# Patient Record
Sex: FEMALE | Race: OTHER | NOT HISPANIC OR LATINO | Employment: FULL TIME | URBAN - METROPOLITAN AREA
[De-identification: names, ages, dates, MRNs, and addresses within clinical notes are randomized per-mention and may not be internally consistent; named-entity substitution may affect disease eponyms.]

---

## 2018-11-12 ENCOUNTER — CLINICAL SUPPORT (OUTPATIENT)
Dept: URGENT CARE | Facility: CLINIC | Age: 33
End: 2018-11-12

## 2018-11-12 PROCEDURE — 99499 UNLISTED E&M SERVICE: CPT | Mod: S$GLB,,, | Performed by: EMERGENCY MEDICINE

## 2018-11-12 PROCEDURE — 86580 TB INTRADERMAL TEST: CPT | Mod: ,,, | Performed by: EMERGENCY MEDICINE

## 2020-06-04 ENCOUNTER — OFFICE VISIT (OUTPATIENT)
Dept: FAMILY MEDICINE | Facility: CLINIC | Age: 35
End: 2020-06-04
Payer: COMMERCIAL

## 2020-06-04 VITALS
OXYGEN SATURATION: 99 % | DIASTOLIC BLOOD PRESSURE: 88 MMHG | HEART RATE: 93 BPM | HEIGHT: 64 IN | SYSTOLIC BLOOD PRESSURE: 132 MMHG | WEIGHT: 149.81 LBS | BODY MASS INDEX: 25.57 KG/M2 | TEMPERATURE: 98 F

## 2020-06-04 DIAGNOSIS — Z13.6 SCREENING FOR ISCHEMIC HEART DISEASE: ICD-10-CM

## 2020-06-04 DIAGNOSIS — Z13.89 SCREENING FOR BLOOD OR PROTEIN IN URINE: ICD-10-CM

## 2020-06-04 DIAGNOSIS — Z78.9 USES BIRTH CONTROL: ICD-10-CM

## 2020-06-04 DIAGNOSIS — Z13.0 SCREENING FOR IRON DEFICIENCY ANEMIA: ICD-10-CM

## 2020-06-04 DIAGNOSIS — J30.1 SEASONAL ALLERGIC RHINITIS DUE TO POLLEN: ICD-10-CM

## 2020-06-04 DIAGNOSIS — M54.50 CHRONIC LOW BACK PAIN WITHOUT SCIATICA, UNSPECIFIED BACK PAIN LATERALITY: ICD-10-CM

## 2020-06-04 DIAGNOSIS — K21.9 GASTROESOPHAGEAL REFLUX DISEASE WITHOUT ESOPHAGITIS: ICD-10-CM

## 2020-06-04 DIAGNOSIS — L30.9 DERMATITIS: Primary | ICD-10-CM

## 2020-06-04 DIAGNOSIS — G89.29 CHRONIC LOW BACK PAIN WITHOUT SCIATICA, UNSPECIFIED BACK PAIN LATERALITY: ICD-10-CM

## 2020-06-04 DIAGNOSIS — Z13.29 ENCOUNTER FOR SCREENING FOR ENDOCRINE DISORDER: ICD-10-CM

## 2020-06-04 DIAGNOSIS — G43.709 CHRONIC MIGRAINE WITHOUT AURA WITHOUT STATUS MIGRAINOSUS, NOT INTRACTABLE: ICD-10-CM

## 2020-06-04 PROCEDURE — 3008F BODY MASS INDEX DOCD: CPT | Mod: S$GLB,,, | Performed by: FAMILY MEDICINE

## 2020-06-04 PROCEDURE — 3008F PR BODY MASS INDEX (BMI) DOCUMENTED: ICD-10-PCS | Mod: S$GLB,,, | Performed by: FAMILY MEDICINE

## 2020-06-04 PROCEDURE — 99203 PR OFFICE/OUTPT VISIT, NEW, LEVL III, 30-44 MIN: ICD-10-PCS | Mod: S$GLB,,, | Performed by: FAMILY MEDICINE

## 2020-06-04 PROCEDURE — 99203 OFFICE O/P NEW LOW 30 MIN: CPT | Mod: S$GLB,,, | Performed by: FAMILY MEDICINE

## 2020-06-04 RX ORDER — OMEPRAZOLE 20 MG/1
20 CAPSULE, DELAYED RELEASE ORAL DAILY
COMMUNITY

## 2020-06-04 RX ORDER — BIOTIN 1000 MCG
TABLET,CHEWABLE ORAL
COMMUNITY

## 2020-06-04 RX ORDER — DROSPIRENONE, ETHINYL ESTRADIOL AND LEVOMEFOLATE CALCIUM AND LEVOMEFOLATE CALCIUM 3-0.02(24)
KIT ORAL
COMMUNITY
Start: 2020-05-31

## 2020-06-04 NOTE — PROGRESS NOTES
SUBJECTIVE:    Patient ID: Meri Krishna is a 35 y.o. female.    Chief Complaint: Establish Care      Pt here to establish care.    Pt with PMHx of HTN, GERD, and Migraine.  Blood sugar issues in her 20s and cholesterol have been elevated in the past.  Have had migraines since 10 years old, familial.    Not currently on anything for her blood pressure.  Got really low when she was pregnant.  Has gained about 10 pounds in the last 2 months.  Normally exercises, but gym has been closed since the COVID pandemic.  Has had more back and bilateral hip pain since having her son. Had an epidural.  The back pain keeps her up at night because she cannot get comfortable and her legs feel heavy like she is carrying around cinder blocks, etc.    Has migraines intermittently.  Can have several a week or can not have them for a while. Has been on preventative meds, including antiepileptics, BP meds.  Unsure of triggers, but knows she can't drink alcohol, issues with homemade breads, [place] foods.    Has a rash that only comes around her period.  Occurs on there lateral right thigh.  It starts as itchy, then get red, bumpy, and then bruises, and then flakes.  Changed the birth control and the rash went away, after skipping the placebo pill.     Has to take prilosec daily or she will wake up in the night looking for tums.    Also she has joint pains and swelling in the hands and ankles get kind of stiff. Does in home IV infusions.  Hurts sometimes to draw up meds.     Has seasonal allergies, takes zyrtec as needed.    Autoimmune disease on the paternal side of the family, with rheumatic and sarcoid  -------------------------------------  Sees GYN yearly,  Pap done yearly, hx abnormal pap 8 years ago with +HPV.       No visits with results within 6 Month(s) from this visit.   Latest known visit with results is:   No results found for any previous visit.       Past Medical History:   Diagnosis Date    Hypertension     Migraine       Past Surgical History:   Procedure Laterality Date    CYST REMOVAL Right 2005     Family History   Problem Relation Age of Onset    Hypertension Mother     Obesity Mother     Diabetes Father     Hypertension Father     Hyperlipidemia Father     Stroke Father     Lung cancer Maternal Grandfather        Marital Status:   Alcohol History:  reports that she drinks alcohol.  Tobacco History:  reports that she has never smoked. She has never used smokeless tobacco.  Drug History:  reports that she does not use drugs.    Review of patient's allergies indicates:   Allergen Reactions    Penicillins Hives, Shortness Of Breath and Swelling       Current Outpatient Medications:     biotin 1,000 mcg Chew, Take by mouth., Disp: , Rfl:     drospirenone-e.estradioL-lm.FA (BEYAZ/LYDIA) 3-0.02-0.451 mg (24) (4) Tab, , Disp: , Rfl:     omeprazole (PRILOSEC) 20 MG capsule, Take 20 mg by mouth once daily., Disp: , Rfl:     Review of Systems   Constitutional: Negative for appetite change, fatigue, fever and unexpected weight change.   Respiratory: Negative for cough, chest tightness, shortness of breath and wheezing.    Cardiovascular: Negative for chest pain and leg swelling.   Gastrointestinal: Negative for abdominal pain, constipation, nausea and vomiting.        -heartburn   Genitourinary: Negative for difficulty urinating, dysuria, frequency and urgency.   Musculoskeletal: Negative for arthralgias, back pain, myalgias and neck pain.   Skin: Negative for rash.   Neurological: Negative for dizziness, weakness, numbness and headaches.   Hematological: Does not bruise/bleed easily.   Psychiatric/Behavioral: Negative for dysphoric mood, sleep disturbance and suicidal ideas. The patient is not nervous/anxious.    All other systems reviewed and are negative.         Objective:      Vitals:    06/04/20 1540   BP: 132/88   Pulse: 93   Temp: 98.3 °F (36.8 °C)   SpO2: 99%   Weight: 67.9 kg (149 lb 12.8 oz)   Height: 5'  "4" (1.626 m)     Body mass index is 25.71 kg/m².     Physical Exam   Constitutional: She is oriented to person, place, and time. She appears well-developed and well-nourished. No distress.   thin   HENT:   Head: Normocephalic and atraumatic.   Neck: Neck supple. No thyromegaly present.   Cardiovascular: Normal rate, regular rhythm and normal heart sounds. Exam reveals no friction rub.   No murmur heard.  Pulmonary/Chest: Effort normal and breath sounds normal. She has no wheezes. She has no rales.   Abdominal: Soft. Bowel sounds are normal. She exhibits no distension. There is no tenderness.   Musculoskeletal: She exhibits no edema.   Lymphadenopathy:     She has no cervical adenopathy.   Neurological: She is alert and oriented to person, place, and time.   Skin: Skin is warm and dry. No rash noted.   Psychiatric: She has a normal mood and affect. Her speech is normal and behavior is normal. Judgment and thought content normal. She is attentive.   Vitals reviewed.        Assessment:       1. Dermatitis    2. Gastroesophageal reflux disease without esophagitis    3. Seasonal allergic rhinitis due to pollen    4. Chronic migraine without aura without status migrainosus, not intractable    5. Chronic low back pain without sciatica, unspecified back pain laterality    6. Screening for ischemic heart disease    7. Uses birth control    8. Encounter for screening for endocrine disorder    9. Screening for blood or protein in urine    10. Screening for iron deficiency anemia         Plan:       Dermatitis  Comments:  Intermittent with menses. Will check labs.  Orders:  -     TSH w/reflex to FT4; Future; Expected date: 06/04/2020  -     Hemoglobin A1C; Future; Expected date: 06/04/2020    Gastroesophageal reflux disease without esophagitis  Comments:  Controlled. Will continue to monitor symptoms on prilosec.     Seasonal allergic rhinitis due to pollen  Comments:  Controlled. To take zyrtec as needed.     Chronic migraine " without aura without status migrainosus, not intractable  Comments:  Stable. Will continue to monitor frequency and severity of headaches.     Chronic low back pain without sciatica, unspecified back pain laterality  Comments:  Chronic. >5 years. Will send to PT, questionable leg length discrepancy. Will reassess after PT.  Orders:  -     Ambulatory referral/consult to Physical/Occupational Therapy; Future; Expected date: 06/11/2020    Screening for ischemic heart disease  -     Comprehensive metabolic panel; Future; Expected date: 06/04/2020  -     Lipid Panel; Future; Expected date: 06/04/2020    Uses birth control    Encounter for screening for endocrine disorder  -     TSH w/reflex to FT4; Future; Expected date: 06/04/2020    Screening for blood or protein in urine  -     Urinalysis Complete; Future; Expected date: 06/04/2020  -     Hemoglobin A1C; Future; Expected date: 06/04/2020    Screening for iron deficiency anemia  -     CBC auto differential; Future; Expected date: 06/04/2020    Labs have been ordered for monitoring of chronic conditions, just before next visit.                                Follow up in about 6 months (around 12/4/2020) for GERD, Migraines, back pain.

## 2020-06-11 ENCOUNTER — PATIENT MESSAGE (OUTPATIENT)
Dept: FAMILY MEDICINE | Facility: CLINIC | Age: 35
End: 2020-06-11

## 2020-06-12 ENCOUNTER — CLINICAL SUPPORT (OUTPATIENT)
Dept: REHABILITATION | Facility: HOSPITAL | Age: 35
End: 2020-06-12
Attending: FAMILY MEDICINE
Payer: COMMERCIAL

## 2020-06-12 DIAGNOSIS — M54.50 CHRONIC LOW BACK PAIN WITHOUT SCIATICA, UNSPECIFIED BACK PAIN LATERALITY: ICD-10-CM

## 2020-06-12 DIAGNOSIS — M53.3 SI (SACROILIAC) JOINT DYSFUNCTION: ICD-10-CM

## 2020-06-12 DIAGNOSIS — R29.898 IMPAIRED FLEXIBILITY OF LOWER EXTREMITY: ICD-10-CM

## 2020-06-12 DIAGNOSIS — M25.651 DECREASED RANGE OF RIGHT HIP MOVEMENT: Primary | ICD-10-CM

## 2020-06-12 DIAGNOSIS — M25.859 HIP IMPINGEMENT SYNDROME, UNSPECIFIED LATERALITY: ICD-10-CM

## 2020-06-12 DIAGNOSIS — R29.898 DECREASED STRENGTH OF LOWER EXTREMITY: ICD-10-CM

## 2020-06-12 DIAGNOSIS — G89.29 CHRONIC LOW BACK PAIN WITHOUT SCIATICA, UNSPECIFIED BACK PAIN LATERALITY: ICD-10-CM

## 2020-06-12 PROBLEM — M25.659 DECREASED RANGE OF MOTION OF HIP: Status: ACTIVE | Noted: 2020-06-12

## 2020-06-12 PROCEDURE — 97161 PT EVAL LOW COMPLEX 20 MIN: CPT

## 2020-06-12 NOTE — PLAN OF CARE
[facility]  Physical Therapy Initial Evaluation    Name: Meri Krishna  [facility] Number: [id number]    Therapy Diagnosis:   Encounter Diagnoses   Name Primary?    Chronic low back pain without sciatica, unspecified back pain laterality     Decreased strength of lower extremity     Decreased range of right hip movement Yes    Impaired flexibility of lower extremity     SI (sacroiliac) joint dysfunction     Hip impingement syndrome, unspecified laterality      Physician: Ashu Hoyt MD    Physician Orders: PT Eval and Treat  Medical Diagnosis from Referral: M54.5,G89.29 (ICD-10-CM) - Chronic low back pain without sciatica, unspecified back pain laterality  Evaluation Date: 6/12/2020  Authorization Period Expiration: 12/31/2020  Current Certification Period: 6/12/20202- 8/21/2020  Plan of Care Expiration: 8/21/2020  Visit # / Visits authorized: 1/ 104 (0/14 per POC)    Time In: 705 AM  Time Out: 750 AM  Total Appointment Time (timed & untimed codes): 45 minutes    Precautions: Standard    Subjective   Date of onset: 5 years ago following vaginal birth of child   History of current condition - Meri reports: bilateral back pain located at PSIS. Pt reports her hips hurt to perform hip flexion and sometimes she has to use her hands to assist with crossing her legs. Pt reports night pain every night. Pt reports she usually has to get up and move around to ease pain sometimes. Pt is a constant ache/discomfort than a sharp pain.  All of this started when she had her son about 5 years ago. Pt denies having this pain prior to having her son. Pt denies numbness and tingling in the legs. Pt denies changes in bowel and bladder. Pt reports the pain is deep and she cant really put her finger on it. Pt reports her legs fatigue more often when she goes up the stairs like her legs feel weak and heavy which is new and feels like she has never been up the stairs but she  has for many years.      Medical History:   Past Medical History:   Diagnosis Date    Hypertension     Migraine        Surgical History:   Meri Krishna  has a past surgical history that includes Cyst Removal (Right, 2005).    Medications:   Meri has a current medication list which includes the following prescription(s): biotin, drospirenone-e.estradiol-lm.fa, and omeprazole.    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, sitting most of the day  Prior Level of Function: independent   Current Level of Function: bike riding, going up the stairs in two story home, unable to run in which she used to run a little, limited range of motion on the L, unable to sit Prydeinig style    Pain:  Current 2/10, worst 8/10, best 2/10   Location: bilateral back   Description: Aching, Dull and Deep   Aggravating Factors: exercising, walking up stairs is hard, but level surface are fine, enjoys the spin class but has not stopped doing this   Easing Factors: heat- doesnt necessary heat, ibuprofen 800 mg / tylenol takes the edge - as needed    Pts goals: decrease pain, more tolerance for activities     Objective     Structural/Postural Inspection:     Standing: normal standing posture     Leg Length:    86 cm bilaterally     Palpation for Condition:     Slight elevation of PSIS and L iliac crest felt, however, supine to sit and leg length was normal   Tenderness to bilateral PSIS   Tenderness to bilateral piriformis   Tenderness/radiating pain with PA glide to lumbar spine, radiating to SI joint     SLS stance: negative for pain     Active Range of Motion (AROM)/Resisted Movements:     Lumbar/Thoracic AROM (Degrees) Comments   Flexion 60  Stretch pull bilaterally    Extension 20  Worse than  flexion    Right Side Bend 15 Stretch    Left Side Bend 15  Stretch    Right Rotation WFL Pain on the R   Left Rotation WFL Pain on the L      Repeated Movements: NT since neg for numbness/tingling     Lumbar Special Test:     Lumbar Special Test  Results Comments   Lumbar Compression  Negative. Just a stretch    Lumbar Distraction  Negative. No change    Prone Instability Negative.      SI Tests/Screen:    Sacroiliac Results Comments   Forten's Sign  Positive. Bilaterally    TTP of Posterior Iliac ligaments  Positive. Bilaterally    SLS pain Negative.    SI Distraction * Positive.    SI Compression * Negative.    Sacral Thrust * Positive.    Gaenslen's* Positive. L only when forced into anterior rotation    Thigh Thrust* Negative.    Standing Forward Flexion Test Positive. Slight decreased movement on the L compared to the R    Gillet's Test Positive. Decreased movement noted on the L compared to the R, pain bilaterally    Supine to sit test  Negative. Indicating normal rotation    FABERs Positive. grion and low back pain    Active SLR with TA activation  Positive. R only    Active SLR with manual compression by PT  Positive. R only      Supine Bridge:  Pain with bridge     LE MMT Testing:  *denotes pain     RLE Strength Grade LLE Strength Grade   Hip Flexion 4+/5 * Hip Flexion 4+/5*   Hip Extension 4+/5 Hip Extension 4+/5   Hip Abduction 4/5 Hip Abduction 4/5   Hip Adduction 4/5 Hip Adduction 4/5   Hip Internal Rotation 4/5* Hip Internal Rotation 4/5*   Hip External Rotation 4-/5* Hip External Rotation 4-/5*   Knee Flexion 4+/5 Knee Flexion 4+/5   Knee Extension 5/5 Knee Extension 5/5   Ankle Dorsiflexion 4+/5 Ankle Dorsiflexion 4+/5     Muscle Length Tension Testing:     Lumbar/Hip/Knee Right  Left  Comments   Hilario Test - -    Hamstring Length (90/90) NT NT Stretch felt with SLR    Ely's Test - -    Debbie's Test + +    Piriformis Length Test        Hip Screen:      Hip/Knee/Ankle (Degrees) AROM (Right) PROM  (Right) AROM (Left) PROM (Left) Comments   Hip Flexion        Hip Extension        Hip Abduction        Hip Adduction        Hip Internal Rotation 45 pain  45 worse than R   pain   Hip External Rotation 25 worse than L  30 pain   pain   Knee Flexion        Knee Extension        Ankle Dorsiflexion        Ankle Plantarflexion        Ankle Inversion        Ankle Eversion          Hip Right Left   Trendelenburg Test Negative. Negative.   FADDIR Test Positive. Positive.   Scour Test Positive. Negative.   [place] Distraction NT. NT.     Sensation: NT 2/2 negative for numbness and tingling reports     Neuro Testing Right  Left Comments   Seated Slump Negative. Positive. Pull at low back    SLR (Sciatic) Negative. Negative.      Joint Accessory: normal mobility with PA glides to lumbar and thoracic spine, however, pain and radiating pain to the SI joint reported with joint assessment     Outcome Measures:     Mod Oswestry Disability Index: 17/50 = 34% disability   LEFS: 37/80 = 46.25% function     TREATMENT     Home Exercises and Patient Education Provided    Education provided:   - Written HEP, Pt demonstrated good understanding based on returned demonstration  - PT goals and POC  -PT educated patient to not perform exercises that are painful as patient reported some hip discomfort with supine piriformis stretch however seated stretch was worse    Written Home Exercises Provided: yes.  Exercises were reviewed and Meri was able to demonstrate them prior to the end of the session.  Meri demonstrated good  understanding of the education provided.     See EMR under Patient Instructions for exercises provided 6/12/2020.    Assessment   Meri is a 35 y.o. female referred to outpatient Physical Therapy with a medical diagnosis of M54.5,G89.29 (ICD-10-CM) - Chronic low back pain without sciatica, unspecified back pain laterality. Pt presents with c/o chronic low back pain that started 5 years ago following the birth  of her child as well as bilateral hip pain. Pt demonstrates Forten's sign and C sign bilaterally. Pt demonstrates signs and symptoms of SI joint dysfunction based on subjective reports and special testing in which she will benefit from core strength to improve stability of SI joint in which distraction test and compression with active SLR lead to a decrease in pain. Pt also demonstrates signs and symptoms consistent with possible hip impingement bilaterally and/or snapping hip syndrome. Pt reports worsening of pain with stairs and exercising especially in the hip. Pt with occasional snapping/popping sensation with hip movement. Pt also demonstrates decreased BLE strength, decreased length of piriformis and IT band, decreased hip ER robbie of R compared to L, pain with hip IR and ER, and WFL lumbar spine AROM with pain associated with certain movements. These deficits have lead to disability rating of 34% based on DINO and 46.25% function based on LEFS. Pt will benefit from skilled PT services to address the above deficits which will lead to ability to return to exercising on bike with decreased pain as well as improve sleep quality.     Pt prognosis is Good.   Pt will benefit from skilled outpatient Physical Therapy to address the deficits stated above and in the chart below, provide pt/family education, and to maximize pt's level of independence.     Plan of care discussed with patient: Yes  Pt's spiritual, cultural and educational needs considered and patient is agreeable to the plan of care and goals as stated below:     Anticipated Barriers for therapy: none     Medical Necessity is demonstrated by the following  History  Co-morbidities and personal factors that may impact the plan of care Co-morbidities:   young age    Personal Factors:   no deficits     low   Examination  Body Structures and Functions, activity limitations and participation restrictions that may impact the plan of care Body Regions:   back  lower  extremities    Body Systems:    gross symmetry  ROM  strength  gross coordinated movement  transfers    Participation Restrictions:   Pain with bike riding     Activity limitations:   Learning and applying knowledge  no deficits    General Tasks and Commands  no deficits    Communication  no deficits    Mobility  walking    Self care  no deficits    Domestic Life  shopping  cooking  doing house work (cleaning house, washing dishes, laundry)    Interactions/Relationships  no deficits    Life Areas  no deficits    Community and Social Life  community life  recreation and leisure         high   Clinical Presentation stable and uncomplicated low   Decision Making/ Complexity Score: low     Goals:    STG  Weeks/Visits Date Established  Goal Status    1.Pt will report at worst pain of </= 6/10 to improve quality of life and sleep quality  4 weeks  6/12/2020      2. Pt will report </= 25% disability based on DINO to improve ability to carry out activities  4 weeks  6/12/2020      3.Pt will report >/= 50/80 on LEFS to improved participation in bike riding  4 weeks  6/12/2020      4.Pt will increase BLE hip ER to >/= 35 deg to improve symmetry between BLEs to improve functional mobility  4 weeks  6/12/2020      5.Pt will demonstrate independence and compliance with HEP to improve therapy outcomes  4 weeks  6/12/2020        LTG Weeks/Visits Date Established  Goal Status    1.Pt will report at worst pain of </= 3/10 to improve quality of life and sleep quality  8 weeks  6/12/2020      2. Pt will report </= 15% disability based on DINO to improve ability to carry out activities  8 weeks  6/12/2020        3.Pt will report >/= 65/80 on LEFS to improved participation in bike riding  8 weeks  6/12/2020      4.Pt will increase BLE hip ER to >/= 40 deg to improve functional mobility  8 weeks  6/12/2020      5.Pt will increase BLE strength to >/= 4+/5 to improve participation in exercise 8 weeks  6/12/2020          Plan   Plan of care  Certification: 6/12/2020 to 8/21/2020.    Outpatient Physical Therapy 2 times weekly for 6 weeks followed by 1x/wk for 2 weeks to include the following interventions: Electrical Stimulation TENs, Manual Therapy, Moist Heat/ Ice, Neuromuscular Re-ed, Patient Education, Therapeutic Activites, Therapeutic Exercise and Ultrasound. Pt may be seen by PTA as part of the rehabilitation team.    Reanna Ludwig, PT

## 2020-06-13 LAB
ALBUMIN SERPL-MCNC: 4.5 G/DL (ref 3.6–5.1)
ALBUMIN/GLOB SERPL: 1.5 (CALC) (ref 1–2.5)
ALP SERPL-CCNC: 45 U/L (ref 31–125)
ALT SERPL-CCNC: 18 U/L (ref 6–29)
APPEARANCE UR: CLEAR
AST SERPL-CCNC: 19 U/L (ref 10–30)
BACTERIA #/AREA URNS HPF: NORMAL /HPF
BASOPHILS # BLD AUTO: 64 CELLS/UL (ref 0–200)
BASOPHILS NFR BLD AUTO: 0.7 %
BILIRUB SERPL-MCNC: 0.4 MG/DL (ref 0.2–1.2)
BILIRUB UR QL STRIP: NEGATIVE
BUN SERPL-MCNC: 20 MG/DL (ref 7–25)
BUN/CREAT SERPL: ABNORMAL (CALC) (ref 6–22)
CALCIUM SERPL-MCNC: 10 MG/DL (ref 8.6–10.2)
CHLORIDE SERPL-SCNC: 104 MMOL/L (ref 98–110)
CHOLEST SERPL-MCNC: 224 MG/DL
CHOLEST/HDLC SERPL: 2.3 (CALC)
CO2 SERPL-SCNC: 27 MMOL/L (ref 20–32)
COLOR UR: YELLOW
CREAT SERPL-MCNC: 0.88 MG/DL (ref 0.5–1.1)
EOSINOPHIL # BLD AUTO: 37 CELLS/UL (ref 15–500)
EOSINOPHIL NFR BLD AUTO: 0.4 %
ERYTHROCYTE [DISTWIDTH] IN BLOOD BY AUTOMATED COUNT: 12.2 % (ref 11–15)
GFRSERPLBLD MDRD-ARVRAT: 84 ML/MIN/1.73M2
GLOBULIN SER CALC-MCNC: 3 G/DL (CALC) (ref 1.9–3.7)
GLUCOSE SERPL-MCNC: 101 MG/DL (ref 65–99)
GLUCOSE UR QL STRIP: NEGATIVE
HBA1C MFR BLD: 5.2 % OF TOTAL HGB
HCT VFR BLD AUTO: 41.1 % (ref 35–45)
HDLC SERPL-MCNC: 97 MG/DL
HGB BLD-MCNC: 13.9 G/DL (ref 11.7–15.5)
HGB UR QL STRIP: NEGATIVE
HYALINE CASTS #/AREA URNS LPF: NORMAL /LPF
KETONES UR QL STRIP: NEGATIVE
LDLC SERPL CALC-MCNC: 108 MG/DL (CALC)
LEUKOCYTE ESTERASE UR QL STRIP: NEGATIVE
LYMPHOCYTES # BLD AUTO: 2033 CELLS/UL (ref 850–3900)
LYMPHOCYTES NFR BLD AUTO: 22.1 %
MCH RBC QN AUTO: 31.2 PG (ref 27–33)
MCHC RBC AUTO-ENTMCNC: 33.8 G/DL (ref 32–36)
MCV RBC AUTO: 92.4 FL (ref 80–100)
MONOCYTES # BLD AUTO: 699 CELLS/UL (ref 200–950)
MONOCYTES NFR BLD AUTO: 7.6 %
NEUTROPHILS # BLD AUTO: 6366 CELLS/UL (ref 1500–7800)
NEUTROPHILS NFR BLD AUTO: 69.2 %
NITRITE UR QL STRIP: NEGATIVE
NONHDLC SERPL-MCNC: 127 MG/DL (CALC)
PH UR STRIP: 6.5 [PH] (ref 5–8)
PLATELET # BLD AUTO: 326 THOUSAND/UL (ref 140–400)
PMV BLD REES-ECKER: 10.6 FL (ref 7.5–12.5)
POTASSIUM SERPL-SCNC: 4.7 MMOL/L (ref 3.5–5.3)
PROT SERPL-MCNC: 7.5 G/DL (ref 6.1–8.1)
PROT UR QL STRIP: NEGATIVE
RBC # BLD AUTO: 4.45 MILLION/UL (ref 3.8–5.1)
RBC #/AREA URNS HPF: NORMAL /HPF
SODIUM SERPL-SCNC: 140 MMOL/L (ref 135–146)
SP GR UR STRIP: 1.01 (ref 1–1.03)
SQUAMOUS #/AREA URNS HPF: NORMAL /HPF
TRIGL SERPL-MCNC: 99 MG/DL
TSH SERPL-ACNC: 1.36 MIU/L
WBC # BLD AUTO: 9.2 THOUSAND/UL (ref 3.8–10.8)
WBC #/AREA URNS HPF: NORMAL /HPF

## 2020-06-16 ENCOUNTER — CLINICAL SUPPORT (OUTPATIENT)
Dept: REHABILITATION | Facility: HOSPITAL | Age: 35
End: 2020-06-16
Payer: COMMERCIAL

## 2020-06-16 DIAGNOSIS — R29.898 IMPAIRED FLEXIBILITY OF LOWER EXTREMITY: ICD-10-CM

## 2020-06-16 DIAGNOSIS — R29.898 DECREASED STRENGTH OF LOWER EXTREMITY: Primary | ICD-10-CM

## 2020-06-16 DIAGNOSIS — M25.651 DECREASED RANGE OF RIGHT HIP MOVEMENT: ICD-10-CM

## 2020-06-16 DIAGNOSIS — M25.859 HIP IMPINGEMENT SYNDROME, UNSPECIFIED LATERALITY: ICD-10-CM

## 2020-06-16 DIAGNOSIS — M53.3 SI (SACROILIAC) JOINT DYSFUNCTION: ICD-10-CM

## 2020-06-16 PROCEDURE — 97110 THERAPEUTIC EXERCISES: CPT

## 2020-06-16 NOTE — PROGRESS NOTES
Replaced by [facility] OUTPATIENT  Physical Therapy Daily Treatment Note     Name: Meri BROWNING Wood  Clinic Number: [id number]    Therapy Diagnosis:   Encounter Diagnoses   Name Primary?    Decreased strength of lower extremity Yes    Decreased range of right hip movement     Impaired flexibility of lower extremity     SI (sacroiliac) joint dysfunction     Hip impingement syndrome, unspecified laterality      Physician: Ashu Hoyt MD    Visit Date: 6/16/2020    Physician Orders: PT Eval and Treat  Medical Diagnosis from Referral: M54.5,G89.29 (ICD-10-CM) - Chronic low back pain without sciatica, unspecified back pain laterality    Evaluation Date: 6/12/2020  Authorization Period Expiration: 12/31/2020  Current Certification Period: 6/12/20202- 8/21/2020    Plan of Care Expiration: 8/21/2020  Visit # / Visits authorized: 2/ 104 (1/14 per POC, Freq Ev + 2W6, 1W2)    PTA visit 0/5    Total Visit count: 2    PTA Visit: 0/5      Re-assessment due by: 7/12/2020      Time In: 1115  Time Out: 1205      Precautions: Standard      Subjective     Pt reports: She relates that she has been doing ok and currently has no pain only discomfort.  She adds she has been compliant with her HEP.      She was compliant with home exercise program.    Response to previous treatment: First visit following eval.  Functional change: Non-remarkable      Pain: 0/10  Location: bilateral back        Objective       Meri received therapeutic exercises to develop strength, endurance, ROM, flexibility, posture and core stabilization for 42 minutes including:    · Recumbent bike L3 x 6 min  · Seated HS stretch 30 sec x 1  · Seated piriformis stretch 30 sec x 2 (do in supine on next visit)  · Supine IT band stretch with rope 30 sec x 2  · PPT with TrA and 3 sec holds 10 x 2  · HL ball squeezes with 3 sec holds 10 x   · SL clams with PTB (10 x 2)  · SL reverse clams  · SL AB 5 x 2        Meri received the  following manual therapy techniques:N/A were applied to the: N/A for N/A minutes, including:N/A      Meri participated in neuromuscular re-education activities to improve:  for 0 minutes. The following activities were included:  N/A    Meri participated in dynamic functional therapeutic activities to improve functional performance for 0  minutes, including:  N/A    Meri participated in gait training to improve functional mobility and safety for 0 minutes, including:  N/A    Meri received the following direct contact modalities after being cleared for contraindications:N/A    Meri received the following supervised modalities after being cleared for contradictions:      Meri received hot pack for 0 minutes to N/A.    Meri received cold pack for 0 minutes to N/A.      Home Exercises Provided and Patient Education Provided     Education provided:   - Reinforced HEP and educated on mode frequency reps, and sets as well as when to stop TE.  Patient verbalzied understanding, performed return demonstration and with no adverse affects noted nor verbalized.        Written Home Exercises Provided: Patient instructed to cont prior HEP.  Exercises were reviewed and Meri was able to demonstrate them prior to the end of the session.  Meri demonstrated fair  understanding of the education provided.     See EMR under Media for exercises provided prior visit.    Assessment     Patient participated with PT to address her initial deficits with her flexibility, core, and hip strength. Her HEP was reinforced as noted and she was able to perform her noted TE with c/o of B hip and gluteal burning/discomfort as she fatigued with exertion but was able to recover with rest.  She did require VC/TC for improved exs quality and performance with PPT and B hip AB in order for effective strengthening of her core and hips.  Patient was able to dago her noted exs Rx and is expected to improve and progress with cont  PT RX in order to mitigate her pain and optimize her function.        Meri is progressing well towards her goals.   Pt prognosis is Good.     Pt will continue to benefit from skilled outpatient physical therapy to address the deficits listed in the problem list box on initial evaluation, provide pt/family education and to maximize pt's level of independence in the home and community environment.     Pt's spiritual, cultural and educational needs considered and pt agreeable to plan of care and goals.       Anticipated barriers to physical therapy: None      Goals:     STG  Weeks/Visits Date Established  Goal Status    1.Pt will report at worst pain of </= 6/10 to improve quality of life and sleep quality  4 weeks  6/12/2020        2. Pt will report </= 25% disability based on DINO to improve ability to carry out activities  4 weeks  6/12/2020        3.Pt will report >/= 50/80 on LEFS to improved participation in bike riding  4 weeks  6/12/2020        4.Pt will increase BLE hip ER to >/= 35 deg to improve symmetry between BLEs to improve functional mobility  4 weeks  6/12/2020        5.Pt will demonstrate independence and compliance with HEP to improve therapy outcomes  4 weeks  6/12/2020           LTG Weeks/Visits Date Established  Goal Status    1.Pt will report at worst pain of </= 3/10 to improve quality of life and sleep quality  8 weeks  6/12/2020        2. Pt will report </= 15% disability based on DINO to improve ability to carry out activities  8 weeks  6/12/2020           3.Pt will report >/= 65/80 on LEFS to improved participation in bike riding  8 weeks  6/12/2020        4.Pt will increase BLE hip ER to >/= 40 deg to improve functional mobility  8 weeks  6/12/2020        5.Pt will increase BLE strength to >/= 4+/5 to improve participation in exercise 8 weeks  6/12/2020                   Plan     Cont with POC and advance as dago, possible Lumbar mechanical traction.        Ricki Caldwell, PT

## 2020-06-19 ENCOUNTER — CLINICAL SUPPORT (OUTPATIENT)
Dept: REHABILITATION | Facility: HOSPITAL | Age: 35
End: 2020-06-19
Payer: COMMERCIAL

## 2020-06-19 DIAGNOSIS — M25.859 HIP IMPINGEMENT SYNDROME, UNSPECIFIED LATERALITY: ICD-10-CM

## 2020-06-19 DIAGNOSIS — M25.651 DECREASED RANGE OF RIGHT HIP MOVEMENT: ICD-10-CM

## 2020-06-19 DIAGNOSIS — R29.898 IMPAIRED FLEXIBILITY OF LOWER EXTREMITY: ICD-10-CM

## 2020-06-19 DIAGNOSIS — M53.3 SI (SACROILIAC) JOINT DYSFUNCTION: ICD-10-CM

## 2020-06-19 DIAGNOSIS — R29.898 DECREASED STRENGTH OF LOWER EXTREMITY: ICD-10-CM

## 2020-06-19 PROCEDURE — 97110 THERAPEUTIC EXERCISES: CPT | Mod: CQ

## 2020-06-19 NOTE — PROGRESS NOTES
"                               [facility] OUTPATIENT  Physical Therapy Daily Treatment Note     Name: Meri Krishna  [facility] Number: [id number]    Therapy Diagnosis:   Encounter Diagnoses   Name Primary?    Decreased strength of lower extremity     Decreased range of right hip movement     Impaired flexibility of lower extremity     SI (sacroiliac) joint dysfunction     Hip impingement syndrome, unspecified laterality      Physician: Ashu Hoyt MD    Visit Date: 6/19/2020    Physician Orders: PT Eval and Treat  Medical Diagnosis from Referral: M54.5,G89.29 (ICD-10-CM) - Chronic low back pain without sciatica, unspecified back pain laterality    Evaluation Date: 6/12/2020  Authorization Period Expiration: 12/31/2020  Current Certification Period: 6/12/20202- 8/21/2020    Plan of Care Expiration: 8/21/2020  Visit # / Visits authorized: 3/ 104 (2/14 per POC, Freq Ev + 2W6, 1W2)    PTA visit 1/5    Total Visit count: 3    PTA Visit: 1/5      Re-assessment due by: 7/12/2020      Time In: 0745  Time Out: 0830      Precautions: Standard      Subjective     Pt reports: Her pain gets worse through out the day. During sidelying activities pt verbalized when her LE is straight and she bends her knee slightly she is able to pop her hip out of socket. She is able to do this with both hips in weight bearing and non weight bearing positions. She denies TTP and denies pain when this happens. Stating, "It done this my whole life and its more annoying than anything."     She was compliant with home exercise program.    Response to previous treatment: First visit following eval.  Functional change: Non-remarkable      Pain: 0/10  Location: bilateral back        Objective       Meri received therapeutic exercises to develop strength, endurance, ROM, flexibility, posture and core stabilization for 42 minutes including:    · Recumbent bike L3 x 6 min  · Seated HS stretch 30 sec x 1 (discharged)  · Seated " piriformis stretch 30 sec x 2 (discharged)  · Supine IT band stretch with rope 30 sec x 2  · PPT with TrA and 3 sec holds 10 x 2 (NP)  · +HL ball squeezes with bridges x 3 minutes  · +HL bridges with Purple band x 3 minutes  · SL clams with PurpleTB 2 x 15 reps  · SL reverse clams GTB 2 x 15 re[s   · SL AB 2 x 15  · +SL pulsating AB x 50 reps  · +prone hip IR iso 5 sec holds x 15 reps  · +prone hip ER iso 5 sec holds x 15 reps        Meri received the following manual therapy techniques:N/A were applied to the: N/A for N/A minutes, including:N/A      Meri participated in neuromuscular re-education activities to improve:  for 0 minutes. The following activities were included:  N/A    Meri participated in dynamic functional therapeutic activities to improve functional performance for 0  minutes, including:  N/A    Meri participated in gait training to improve functional mobility and safety for 0 minutes, including:  N/A    Meri received the following direct contact modalities after being cleared for contraindications:N/A    Meri received the following supervised modalities after being cleared for contradictions:      Meri received hot pack for 0 minutes to N/A.    Meri received cold pack for 0 minutes to N/A.      Home Exercises Provided and Patient Education Provided     Education provided:   - Advised pt to no longer let her hips pop out of place  - Avoid any exercise that will cause her hips to pop out of place    Written Home Exercises Provided: Patient instructed to cont prior HEP.  Exercises were reviewed and Meri was able to demonstrate them prior to the end of the session.  Meri demonstrated fair  understanding of the education provided.     See EMR under Media for exercises provided prior visit.    Assessment     During tx session, it was noted that when pt slightly flexes and ER hip LE her hip she can easily pop/snap. Therefore, she was cued to perform all there-ex in  a pop/snap free range. As a result, all stretches except for IT band was discharged to focus on tightening B hip joints and improve stability. Pt will continue to benefit from skilled PT to improve B hip stability to allow pt to return recreational activities without discomfort or limitations.       Meri is progressing well towards her goals.   Pt prognosis is Good.     Pt will continue to benefit from skilled outpatient physical therapy to address the deficits listed in the problem list box on initial evaluation, provide pt/family education and to maximize pt's level of independence in the home and community environment.     Pt's spiritual, cultural and educational needs considered and pt agreeable to plan of care and goals.       Anticipated barriers to physical therapy: None      Goals:     STG  Weeks/Visits Date Established  Goal Status    1.Pt will report at worst pain of </= 6/10 to improve quality of life and sleep quality  4 weeks  6/12/2020    In progress 6/19/2020      2. Pt will report </= 25% disability based on DINO to improve ability to carry out activities  4 weeks  6/12/2020    In progress 6/19/2020      3.Pt will report >/= 50/80 on LEFS to improved participation in bike riding  4 weeks  6/12/2020    In progress 6/19/2020      4.Pt will increase BLE hip ER to >/= 35 deg to improve symmetry between BLEs to improve functional mobility  4 weeks  6/12/2020    In progress 6/19/2020      5.Pt will demonstrate independence and compliance with HEP to improve therapy outcomes  4 weeks  6/12/2020    In progress 6/19/2020         LTG Weeks/Visits Date Established  Goal Status    1.Pt will report at worst pain of </= 3/10 to improve quality of life and sleep quality  8 weeks  6/12/2020        2. Pt will report </= 15% disability based on DINO to improve ability to carry out activities  8 weeks  6/12/2020           3.Pt will report >/= 65/80 on LEFS to improved participation in bike riding  8 weeks   6/12/2020        4.Pt will increase BLE hip ER to >/= 40 deg to improve functional mobility  8 weeks  6/12/2020        5.Pt will increase BLE strength to >/= 4+/5 to improve participation in exercise 8 weeks  6/12/2020                   Plan     Cont with POC and advance as dago, possible. Progress hip stability there-ex in a pop/snap free range.        Betzaida Villa, PTA

## 2020-06-26 ENCOUNTER — DOCUMENTATION ONLY (OUTPATIENT)
Dept: REHABILITATION | Facility: HOSPITAL | Age: 35
End: 2020-06-26

## 2020-06-26 NOTE — PROGRESS NOTES
PT/PTA face to face conference completed regarding patient treatment plan and progress towards established goals. Treatment will be continued as described in initial report/ evaluation and treatment/progress notes. Patient will be seen by physical therapist every sixth visit or minimally once per month.       Betzaida Villa, PTA

## 2020-06-27 NOTE — PROGRESS NOTES
"                               [facility] OUTPATIENT  Physical Therapy Daily Treatment Note     Name: Meri BROWNING Wood  Clinic Number: [id number]    Therapy Diagnosis:   Encounter Diagnoses   Name Primary?    Decreased strength of lower extremity Yes    Decreased range of right hip movement     Impaired flexibility of lower extremity     SI (sacroiliac) joint dysfunction     Hip impingement syndrome, unspecified laterality      Physician: Ashu Hoyt MD    Visit Date: 7/1/2020    Physician Orders: PT Eval and Treat  Medical Diagnosis from Referral: M54.5,G89.29 (ICD-10-CM) - Chronic low back pain without sciatica, unspecified back pain laterality    Evaluation Date: 6/12/2020  Authorization Period Expiration: 12/31/2020  Current Certification Period: 6/12/20202- 8/21/2020    Plan of Care Expiration: 8/21/2020  Visit # / Visits authorized: 4/ 104 (3/14 per POC, Freq Ev + 2W6, 1W2)    PTA visit 0/5    Total Visit count: 4        Re-assessment due by: 7/12/2020      Time In: 1710  Time Out: 1800      Precautions: Standard      Subjective     Pt reports: Patient relates that she has been doing well and has been able to dago her activities with only some discomfort and no pain.  She currently relates no pain   Her pain gets worse through out the day. During sidelying activities pt verbalized when her LE is straight and she bends her knee slightly she is able to pop her hip out of socket. She is able to do this with both hips in weight bearing and non weight bearing positions. She denies TTP and denies pain when this happens. Stating, "It done this my whole life and its more annoying than anything."     She was compliant with home exercise program.    Response to previous treatment: First visit following eval.  Functional change: Non-remarkable      Pain: 0/10  Location: bilateral back        Objective       Meri received therapeutic exercises to develop strength, endurance, ROM, flexibility, " posture and core stabilization for 44 minutes including:    · Recumbent bike L3 x 6 min  · Seated HS stretch 30 sec x 1 (discharged)  · Seated piriformis stretch 30 sec x 2 (discharged)  · Supine IT band stretch with rope 30 sec x 2  · PPT with TrA and 3 sec holds 10 x 2 (NP)  · HL ball squeezes with bridges x 3 minutes  · HL bridges with Purple band x 3 minutes  · SL clams with PurpleTB 2 x 15 reps  · SL reverse clams GTB 2 x 15 reps   · +Standing hip wall isometrics with 3 sec holds 10 x 2   · SL AB 2 x 15  · SL pulsating AB x 50 reps held  · prone hip IR iso 5 sec holds x 15 reps x 2 (pillow at hips)  · prone hip ER iso 5 sec holds x 15 reps x 2 (pilllow at hips)        Meri received the following manual therapy techniques:N/A were applied to the: N/A for N/A minutes, including:N/A      Meri participated in neuromuscular re-education activities to improve:  for 0 minutes. The following activities were included:  N/A    Meri participated in dynamic functional therapeutic activities to improve functional performance for 0  minutes, including:  N/A    Meri participated in gait training to improve functional mobility and safety for 0 minutes, including:  N/A    Meri received the following direct contact modalities after being cleared for contraindications:N/A    Meri received the following supervised modalities after being cleared for contradictions:      Meri received hot pack for 0 minutes to N/A.    Meri received cold pack for 0 minutes to N/A.      Home Exercises Provided and Patient Education Provided     Education provided:   - Advised pt to no longer let her hips pop out of place  - Avoid any exercise that will cause her hips to pop out of place    Written Home Exercises Provided: Patient instructed to cont prior HEP. And updated exs with hip wall sub maximal isometrics, educated on mode frequency reps, and sets as well as when to stop TE.  Patient verbalzied understanding,  performed return demonstration and with no adverse affects noted nor verbalized.      Exercises were reviewed and Meri was able to demonstrate them prior to the end of the session.  Meri demonstrated fair  understanding of the education provided.     See EMR under Media for exercises provided prior visit.    Assessment     Patient was able to perform her noted TE without c/o and with a minimal amount of popping and snapping to her B hips.  She did require cueing for improved exs quality and performance and primarily to target her glut medius.  Moreover she was able to dago hip wall isometrics with some discomfort which resolved with cueing for submaximal contraction.  She cont to relate steady improvements with no pain and only discomfort.  She is expected to cont to progress with cont PT RX in order to obtain her goals and optimize her pain free functional [place].            Meri is progressing well towards her goals.   Pt prognosis is Good.     Pt will continue to benefit from skilled outpatient physical therapy to address the deficits listed in the problem list box on initial evaluation, provide pt/family education and to maximize pt's level of independence in the home and community environment.     Pt's spiritual, cultural and educational needs considered and pt agreeable to plan of care and goals.       Anticipated barriers to physical therapy: None      Goals:     STG  Weeks/Visits Date Established  Goal Status    1.Pt will report at worst pain of </= 6/10 to improve quality of life and sleep quality  4 weeks  6/12/2020    In progress 7/1/2020      2. Pt will report </= 25% disability based on DINO to improve ability to carry out activities  4 weeks  6/12/2020    In progress 7/1/2020      3.Pt will report >/= 50/80 on LEFS to improved participation in bike riding  4 weeks  6/12/2020    In progress 7/1/2020      4.Pt will increase BLE hip ER to >/= 35 deg to improve symmetry between BLEs to  improve functional mobility  4 weeks  6/12/2020    In progress 7/1/2020      5.Pt will demonstrate independence and compliance with HEP to improve therapy outcomes  4 weeks  6/12/2020    In progress 7/1/2020         LTG Weeks/Visits Date Established  Goal Status    1.Pt will report at worst pain of </= 3/10 to improve quality of life and sleep quality  8 weeks  6/12/2020        2. Pt will report </= 15% disability based on DINO to improve ability to carry out activities  8 weeks  6/12/2020           3.Pt will report >/= 65/80 on LEFS to improved participation in bike riding  8 weeks  6/12/2020        4.Pt will increase BLE hip ER to >/= 40 deg to improve functional mobility  8 weeks  6/12/2020        5.Pt will increase BLE strength to >/= 4+/5 to improve participation in exercise 8 weeks  6/12/2020                   Plan     Cont with POC and advance as dago, possible. Progress hip stability there-ex in a pop/snap free range. Follow up on hip wall isometirics.          Ricki Caldwell, PT

## 2020-07-01 ENCOUNTER — CLINICAL SUPPORT (OUTPATIENT)
Dept: REHABILITATION | Facility: HOSPITAL | Age: 35
End: 2020-07-01
Payer: COMMERCIAL

## 2020-07-01 DIAGNOSIS — M25.651 DECREASED RANGE OF RIGHT HIP MOVEMENT: ICD-10-CM

## 2020-07-01 DIAGNOSIS — R29.898 DECREASED STRENGTH OF LOWER EXTREMITY: Primary | ICD-10-CM

## 2020-07-01 DIAGNOSIS — R29.898 IMPAIRED FLEXIBILITY OF LOWER EXTREMITY: ICD-10-CM

## 2020-07-01 DIAGNOSIS — M25.859 HIP IMPINGEMENT SYNDROME, UNSPECIFIED LATERALITY: ICD-10-CM

## 2020-07-01 DIAGNOSIS — M53.3 SI (SACROILIAC) JOINT DYSFUNCTION: ICD-10-CM

## 2020-07-01 PROCEDURE — 97110 THERAPEUTIC EXERCISES: CPT

## 2020-07-06 ENCOUNTER — CLINICAL SUPPORT (OUTPATIENT)
Dept: REHABILITATION | Facility: HOSPITAL | Age: 35
End: 2020-07-06
Payer: COMMERCIAL

## 2020-07-06 DIAGNOSIS — M25.859 HIP IMPINGEMENT SYNDROME, UNSPECIFIED LATERALITY: ICD-10-CM

## 2020-07-06 DIAGNOSIS — R29.898 DECREASED STRENGTH OF LOWER EXTREMITY: ICD-10-CM

## 2020-07-06 DIAGNOSIS — M53.3 SI (SACROILIAC) JOINT DYSFUNCTION: ICD-10-CM

## 2020-07-06 DIAGNOSIS — R29.898 IMPAIRED FLEXIBILITY OF LOWER EXTREMITY: ICD-10-CM

## 2020-07-06 DIAGNOSIS — M25.651 DECREASED RANGE OF RIGHT HIP MOVEMENT: ICD-10-CM

## 2020-07-06 PROCEDURE — 97110 THERAPEUTIC EXERCISES: CPT | Mod: CQ

## 2020-07-06 NOTE — PROGRESS NOTES
[facility] OUTPATIENT  Physical Therapy Daily Treatment Note     Name: Meri BROWNING Wood  Clinic Number: [id number]    Therapy Diagnosis:   Encounter Diagnoses   Name Primary?    Decreased strength of lower extremity     Decreased range of right hip movement     Impaired flexibility of lower extremity     SI (sacroiliac) joint dysfunction     Hip impingement syndrome, unspecified laterality      Physician: Ashu Hoyt MD    Visit Date: 7/6/2020    Physician Orders: PT Eval and Treat  Medical Diagnosis from Referral: M54.5,G89.29 (ICD-10-CM) - Chronic low back pain without sciatica, unspecified back pain laterality    Evaluation Date: 6/12/2020  Authorization Period Expiration: 12/31/2020  Current Certification Period: 6/12/20202- 8/21/2020    Plan of Care Expiration: 8/21/2020  Visit # / Visits authorized: 5/ 104 (4/14 per POC, Freq Ev + 2W6, 1W2)    PTA visit 1/5    Total Visit count: 5        Re-assessment due by: 7/12/2020      Time In: 1715  Time Out: 1800      Precautions: Standard      Subjective     Pt reports: More issues at night because as soon as she crosses her ankles while supine her hips pop out.     She was compliant with home exercise program.    Response to previous treatment: First visit following eval.  Functional change: Non-remarkable      Pain: 0/10  Location: bilateral back        Objective       Meri received therapeutic exercises to develop strength, endurance, ROM, flexibility, posture and core stabilization for 44 minutes including:    · Recumbent bike L3 x 5 min  · Supine IT band stretch with rope 30 sec x 2  · PPT with TrA and 3 sec holds 10 x 2 (NP)  · HL ball squeezes with bridges x 3 minutes  · HL bridges with Purple band x 3 minutes  · SL clams with PurpleTB 2 x 15 reps  · SL reverse clams GTB 2 x 15 reps   · Standing hip ABD isometrics on wall with swiss ball 3 sec holds 10 x 2   · Standing glute med hip hikes x 20 reps  BLE    Applied KT to improve stability of B hip joints for pain reduction. 3 I strips for mechanical correction     Not performing:  · SL AB 2 x 15  · SL pulsating AB x 50 reps held  · prone hip IR iso 5 sec holds x 15 reps x 2 (pillow at hips)  · prone hip ER iso 5 sec holds x 15 reps x 2 (pilllow at hips)        Meri received the following manual therapy techniques:N/A were applied to the: N/A for N/A minutes, including:N/A      Meri participated in neuromuscular re-education activities to improve:  for 0 minutes. The following activities were included:  N/A    Meri participated in dynamic functional therapeutic activities to improve functional performance for 0  minutes, including:  N/A    Meri participated in gait training to improve functional mobility and safety for 0 minutes, including:  N/A    Meri received the following direct contact modalities after being cleared for contraindications:N/A    Meri received the following supervised modalities after being cleared for contradictions:      Meri received hot pack for 0 minutes to N/A.    Meri received cold pack for 0 minutes to N/A.      Home Exercises Provided and Patient Education Provided     Education provided:   - Advised pt to no longer let her hips pop out of place  - Avoid any exercise that will cause her hips to pop out of place    Written Home Exercises Provided: Patient instructed to cont prior HEP. And updated exs with hip wall sub maximal isometrics, educated on mode frequency reps, and sets as well as when to stop TE.  Patient verbalzied understanding, performed return demonstration and with no adverse affects noted nor verbalized.      Exercises were reviewed and Meri was able to demonstrate them prior to the end of the session.  Meri demonstrated fair  understanding of the education provided.     See EMR under Media for exercises provided prior visit.    Assessment     Pt tolerated progressed there-ex  well. She verbalized early onset of fatigue with glute med hip hikes evident by muscle quivering. Applied KT to improve B hip stability to allow pt to weight bear without increased discomfort in B hips. Pt will continue to benefit from skilled PT to improve B glute med strength and hip stability to allow pt to experience improved quality of sleep.     Meri is progressing well towards her goals.   Pt prognosis is Good.     Pt will continue to benefit from skilled outpatient physical therapy to address the deficits listed in the problem list box on initial evaluation, provide pt/family education and to maximize pt's level of independence in the home and community environment.     Pt's spiritual, cultural and educational needs considered and pt agreeable to plan of care and goals.       Anticipated barriers to physical therapy: None      Goals:     STG  Weeks/Visits Date Established  Goal Status    1.Pt will report at worst pain of </= 6/10 to improve quality of life and sleep quality  4 weeks  6/12/2020    In progress 7/6/2020      2. Pt will report </= 25% disability based on DINO to improve ability to carry out activities  4 weeks  6/12/2020    In progress 7/6/2020      3.Pt will report >/= 50/80 on LEFS to improved participation in bike riding  4 weeks  6/12/2020    In progress 7/6/2020      4.Pt will increase BLE hip ER to >/= 35 deg to improve symmetry between BLEs to improve functional mobility  4 weeks  6/12/2020    In progress 7/6/2020      5.Pt will demonstrate independence and compliance with HEP to improve therapy outcomes  4 weeks  6/12/2020    In progress 7/6/2020         LTG Weeks/Visits Date Established  Goal Status    1.Pt will report at worst pain of </= 3/10 to improve quality of life and sleep quality  8 weeks  6/12/2020        2. Pt will report </= 15% disability based on DINO to improve ability to carry out activities  8 weeks  6/12/2020           3.Pt will report >/= 65/80 on LEFS to improved  participation in bike riding  8 weeks  6/12/2020        4.Pt will increase BLE hip ER to >/= 40 deg to improve functional mobility  8 weeks  6/12/2020        5.Pt will increase BLE strength to >/= 4+/5 to improve participation in exercise 8 weeks  6/12/2020                   Plan     Cont with POC and advance as dago, possible. Progress hip stability there-ex in a pop/snap free range. Follow up on hip wall isometirics.          Betzaida Villa, PTA

## 2020-07-09 ENCOUNTER — CLINICAL SUPPORT (OUTPATIENT)
Dept: REHABILITATION | Facility: HOSPITAL | Age: 35
End: 2020-07-09
Payer: COMMERCIAL

## 2020-07-09 DIAGNOSIS — R29.898 IMPAIRED FLEXIBILITY OF LOWER EXTREMITY: ICD-10-CM

## 2020-07-09 DIAGNOSIS — M53.3 SI (SACROILIAC) JOINT DYSFUNCTION: ICD-10-CM

## 2020-07-09 DIAGNOSIS — M25.859 HIP IMPINGEMENT SYNDROME, UNSPECIFIED LATERALITY: ICD-10-CM

## 2020-07-09 DIAGNOSIS — M25.651 DECREASED RANGE OF RIGHT HIP MOVEMENT: ICD-10-CM

## 2020-07-09 DIAGNOSIS — R29.898 DECREASED STRENGTH OF LOWER EXTREMITY: ICD-10-CM

## 2020-07-09 PROCEDURE — 97110 THERAPEUTIC EXERCISES: CPT | Mod: CQ

## 2020-07-09 NOTE — PROGRESS NOTES
[facility] OUTPATIENT  Physical Therapy Daily Treatment Note     Name: Meri Krishna  [facility] Number: [id number]    Therapy Diagnosis:   Encounter Diagnoses   Name Primary?    Decreased strength of lower extremity     Decreased range of right hip movement     Impaired flexibility of lower extremity     SI (sacroiliac) joint dysfunction     Hip impingement syndrome, unspecified laterality      Physician: Ashu Hoyt MD    Visit Date: 7/9/2020    Physician Orders: PT Eval and Treat  Medical Diagnosis from Referral: M54.5,G89.29 (ICD-10-CM) - Chronic low back pain without sciatica, unspecified back pain laterality    Evaluation Date: 6/12/2020  Authorization Period Expiration: 12/31/2020  Current Certification Period: 6/12/20202- 8/21/2020    Plan of Care Expiration: 8/21/2020  Visit # / Visits authorized: 6/ 104 (5/14 per POC, Freq Ev + 2W6, 1W2)    Total Visit count: 6        Re-assessment due by: 7/12/2020      Time In: 1700  Time Out: 1600  Total time: 55    Precautions: Standard      Subjective     Pt reports: Didn't notice any improvement with KT application.     She was compliant with home exercise program.    Response to previous treatment: First visit following eval.  Functional change: Non-remarkable      Pain: 0/10  Location: bilateral back        Objective       Meri received therapeutic exercises to develop strength, endurance, ROM, flexibility, posture and core stabilization for 44 minutes including:    · Recumbent bike L3 x 5 min  · Standing hip ABD isometrics on wall with swiss ball 3 sec holds 10 x 2   · Standing glute med hip hikes x 20 reps BLE  · Side stepping with GTB x 4 laps (discontinue next visit)  · Squat then standing hip abd x 10 reps (discontinue next visit)  · +Quadruped Bilateral hip ER isometrics focusing on preventing psoas activation 3 x 30 sec each  · TrA 5 sec holds x 3 minutes   Focusing on isolating TrA from hip flexors    · Education on improving posture- weight shifting over mid foot, activating TrA to prevent hanging on Y ligaments, and relaxing shoulders               Not performed:   · SL clams with PurpleTB 2 x 15 reps  · SL reverse clams GTB 2 x 15 reps   · HL ball squeezes with bridges x 3 minutes  · HL bridges with Purple band x 3 minutes  · Supine IT band stretch with rope 30 sec x 2          Meri received the following manual therapy techniques:N/A were applied to the: N/A for N/A minutes, including:N/A      Meri participated in neuromuscular re-education activities to improve:  for 0 minutes. The following activities were included:  N/A    Meri participated in dynamic functional therapeutic activities to improve functional performance for 0  minutes, including:  N/A    Meri participated in gait training to improve functional mobility and safety for 0 minutes, including:  N/A    Meri received the following direct contact modalities after being cleared for contraindications:N/A    Meri received the following supervised modalities after being cleared for contradictions:      Meri received hot pack for 0 minutes to N/A.    Meri received cold pack for 0 minutes to N/A.      Home Exercises Provided and Patient Education Provided     Education provided:   - Advised pt to no longer let her hips pop out of place  - Avoid any exercise that will cause her hips to pop out of place    Written Home Exercises Provided: Patient instructed to cont prior HEP. And updated exs with hip wall sub maximal isometrics, educated on mode frequency reps, and sets as well as when to stop TE.  Patient verbalzied understanding, performed return demonstration and with no adverse affects noted nor verbalized.      Exercises were reviewed and Meri was able to demonstrate them prior to the end of the session.  Meri demonstrated fair  understanding of the education provided.     See EMR under Media for exercises  provided prior visit.    Assessment     During quadruped exercise pt noted /c more difficulty turning psoas off therefore req'd cueing to shift weight fwd to prevent hip flexor compensation and activate true hip ER's. Pt responded well to cueing as she was able to isolate TrA and ER's from hip flexor but noted to fatigue quickly. Pt will continue to benefit from skilled PT to improve B hip ER's, reduce hip flexor compensation, and improve standing posture to allow pt to return to PLOF without pain or limitations.    Meri is progressing well towards her goals.   Pt prognosis is Good.     Pt will continue to benefit from skilled outpatient physical therapy to address the deficits listed in the problem list box on initial evaluation, provide pt/family education and to maximize pt's level of independence in the home and community environment.     Pt's spiritual, cultural and educational needs considered and pt agreeable to plan of care and goals.       Anticipated barriers to physical therapy: None      Goals:     STG  Weeks/Visits Date Established  Goal Status    1.Pt will report at worst pain of </= 6/10 to improve quality of life and sleep quality  4 weeks  6/12/2020    In progress 7/9/2020      2. Pt will report </= 25% disability based on DINO to improve ability to carry out activities  4 weeks  6/12/2020    In progress 7/9/2020      3.Pt will report >/= 50/80 on LEFS to improved participation in bike riding  4 weeks  6/12/2020    In progress 7/9/2020      4.Pt will increase BLE hip ER to >/= 35 deg to improve symmetry between BLEs to improve functional mobility  4 weeks  6/12/2020    In progress 7/9/2020      5.Pt will demonstrate independence and compliance with HEP to improve therapy outcomes  4 weeks  6/12/2020    In progress 7/9/2020         LTG Weeks/Visits Date Established  Goal Status    1.Pt will report at worst pain of </= 3/10 to improve quality of life and sleep quality  8 weeks  6/12/2020        2.  Pt will report </= 15% disability based on DINO to improve ability to carry out activities  8 weeks  6/12/2020           3.Pt will report >/= 65/80 on LEFS to improved participation in bike riding  8 weeks  6/12/2020        4.Pt will increase BLE hip ER to >/= 40 deg to improve functional mobility  8 weeks  6/12/2020        5.Pt will increase BLE strength to >/= 4+/5 to improve participation in exercise 8 weeks  6/12/2020                   Plan     Cont with POC and advance as dago, possible. Progress hip stability there-ex in a pop/snap free range. Continue to work on isolating hip ER from hip flexors in quadruped position.       Betzaida Villa, PTA

## 2020-07-19 NOTE — PROGRESS NOTES
"7                               [facility] OUTPATIENT  Physical Therapy Daily Treatment Note/Reassessment     Name: Meri Krishna  [facility] Number: [id number]    Therapy Diagnosis:   Encounter Diagnoses   Name Primary?    Decreased strength of lower extremity Yes    Decreased range of right hip movement     Impaired flexibility of lower extremity     SI (sacroiliac) joint dysfunction     Hip impingement syndrome, unspecified laterality      Physician: Ashu Hoyt MD    Visit Date: 7/20/2020    Physician Orders: PT Eval and Treat  Medical Diagnosis from Referral: M54.5,G89.29 (ICD-10-CM) - Chronic low back pain without sciatica, unspecified back pain laterality    Evaluation Date: 6/12/2020  Authorization Period Expiration: 12/31/2020  Current Certification Period: 6/12/20202- 8/21/2020    Plan of Care Expiration: 8/21/2020  Visit # / Visits authorized: 7/ 104 (5/14 per POC, Freq Ev + 2W6, 1W2)    Total Visit count: 7        Re-assessment due by: 8/20/2020      Time In: 1715  Time Out: 1805      Precautions: Standard      Subjective     Pt reports: She is doing about the same today and does notice some overall improvement.  " I didn't get this way over night, I know its going to take some time."  She also adds that she has had a near fall with decending stairs last week.      She was compliant with home exercise program.    Response to previous treatment: as noted  Functional change: as noted      Pain: 0/10  Location: bilateral back        Objective       Meri received therapeutic exercises to develop strength, endurance, ROM, flexibility, posture and core stabilization for 30 minutes including:    · Recumbent bike L3 x 5 min  · Standing hip ABD isometrics on wall with swiss ball 3 sec holds 10 x 2 held  · Standing glute med hip hikes x 20 reps BLE held  · Quadruped Bilateral hip ER isometrics focusing on preventing psoas activation 3 x 30 sec each  · +Quadruped Multifidi hikes 10 x " 2  · TrA 5 sec holds x 3 minutes   Focusing on isolating TrA from hip flexors   + Quadruped alt UE with hip ER isometrics and mitigating psoas activation 10 x each  · Education on improving posture- weight shifting over mid foot, activating TrA to prevent hanging on Y ligaments, and relaxing shoulders      Lumbar stabiliazaation               Not performed:   · SL clams with PurpleTB 2 x 15 reps  · SL reverse clams GTB 2 x 15 reps   · HL ball squeezes with bridges x 3 minutes  · HL bridges with Purple band x 3 minutes  · Supine IT band stretch with rope 30 sec x 2  · Side stepping with GTB x 4 laps (discontinue next visit)  · Squat then standing hip abd x 10 reps (discontinue next visit)  ·           Meri received the following manual therapy techniques:N/A were applied to the: N/A for N/A minutes, including:N/A      Meri participated in neuromuscular re-education activities to improve:  for 0 minutes. The following activities were included:  N/A    Meri participated in dynamic functional therapeutic activities/physical performance testing to improve functional performance for 10 minutes, including:          Mod I: 18/50 = 36% Disability    LEFS: 47/80 = 58.75% Function.      ROM:     Hip  Left  Right     ER: 30*  35*        N/A    Meri participated in gait training to improve functional mobility and safety for 0 minutes, including:  N/A    Meri received the following direct contact modalities after being cleared for contraindications:N/A    Meri received the following supervised modalities after being cleared for contradictions:      Meri received hot pack for 0 minutes to N/A.    Meri received cold pack for 0 minutes to N/A.      Home Exercises Provided and Patient Education Provided     Education provided:   - Advised pt to no longer let her hips pop out of place  - Avoid any exercise that will cause her hips to pop out of place    Written Home Exercises Provided: Patient  instructed to cont prior HEP. And updated exs with hip wall sub maximal isometrics, educated on mode frequency reps, and sets as well as when to stop TE.  Patient verbalzied understanding, performed return demonstration and with no adverse affects noted nor verbalized.      Exercises were reviewed and Meri was able to demonstrate them prior to the end of the session.  Meri demonstrated fair  understanding of the education provided.     See EMR under Media for exercises provided prior visit.    ReAssessment     Patient has participated with PT to address her current deficits including her hypomobility of her B hips.  She has related some improvements with her overall symptoms but does continue with increased L hip pain/discomfort vs Right.  Her current Rx is surrounding increased activation of her superior and inferior gemelli and appropriate activation/sequencing of her psoas in order to improve her motor control as well as provide improved hip stability.  She currently relates a disability score of 36% as per the MOD I and a functional score of 58.75% as per the LEFS and has met STG 1 and 5 while goals 2, 3, and 4 are in progress.  She is expected to steadily improve with cont Rx for improved hip and core strength in order to obtain her established goals and optimize her safe functional mob. [place].        Meri is progressing well towards her goals.   Pt prognosis is Good.     Pt will continue to benefit from skilled outpatient physical therapy to address the deficits listed in the problem list box on initial evaluation, provide pt/family education and to maximize pt's level of independence in the home and community environment.     Pt's spiritual, cultural and educational needs considered and pt agreeable to plan of care and goals.       Anticipated barriers to physical therapy: None      Goals:     STG  Weeks/Visits Date Established  Goal Status    1.Pt will report at worst pain of </= 6/10 to  improve quality of life and sleep quality  4 weeks  6/12/2020    Met 7/20/2020    2. Pt will report </= 25% disability based on DINO to improve ability to carry out activities  4 weeks  6/12/2020    In progress 7/20/2020      3.Pt will report >/= 50/80 on LEFS to improved participation in bike riding  4 weeks  6/12/2020    In progress 7/20/2020      4.Pt will increase BLE hip ER to >/= 35 deg to improve symmetry between BLEs to improve functional mobility  4 weeks  6/12/2020    In progress 7/20/2020      5.Pt will demonstrate independence and compliance with HEP to improve therapy outcomes  4 weeks  6/12/2020    Met 7/20/2020       LTG Weeks/Visits Date Established  Goal Status    1.Pt will report at worst pain of </= 3/10 to improve quality of life and sleep quality  8 weeks  6/12/2020        2. Pt will report </= 15% disability based on DINO to improve ability to carry out activities  8 weeks  6/12/2020           3.Pt will report >/= 65/80 on LEFS to improved participation in bike riding  8 weeks  6/12/2020        4.Pt will increase BLE hip ER to >/= 40 deg to improve functional mobility  8 weeks  6/12/2020        5.Pt will increase BLE strength to >/= 4+/5 to improve participation in exercise 8 weeks  6/12/2020                   Plan        Cont with POC and advance as dago, possible. Progress hip stability there-ex in a pop/snap free range. Continue to work on isolating hip ER from hip flexors in quadruped position.       Ricki Caldwell, PT

## 2020-07-20 ENCOUNTER — CLINICAL SUPPORT (OUTPATIENT)
Dept: REHABILITATION | Facility: HOSPITAL | Age: 35
End: 2020-07-20
Payer: COMMERCIAL

## 2020-07-20 DIAGNOSIS — M25.859 HIP IMPINGEMENT SYNDROME, UNSPECIFIED LATERALITY: ICD-10-CM

## 2020-07-20 DIAGNOSIS — R29.898 DECREASED STRENGTH OF LOWER EXTREMITY: Primary | ICD-10-CM

## 2020-07-20 DIAGNOSIS — M53.3 SI (SACROILIAC) JOINT DYSFUNCTION: ICD-10-CM

## 2020-07-20 DIAGNOSIS — R29.898 IMPAIRED FLEXIBILITY OF LOWER EXTREMITY: ICD-10-CM

## 2020-07-20 DIAGNOSIS — M25.651 DECREASED RANGE OF RIGHT HIP MOVEMENT: ICD-10-CM

## 2020-07-20 PROCEDURE — 97750 PHYSICAL PERFORMANCE TEST: CPT

## 2020-07-20 PROCEDURE — 97110 THERAPEUTIC EXERCISES: CPT

## 2020-07-25 NOTE — PROGRESS NOTES
7                               [facility] OUTPATIENT  Physical Therapy Daily Treatment Note     Name: Meri Krishna  [facility] Number: [id number]    Therapy Diagnosis:   Encounter Diagnoses   Name Primary?    Decreased strength of lower extremity Yes    Decreased range of right hip movement     Impaired flexibility of lower extremity     SI (sacroiliac) joint dysfunction     Hip impingement syndrome, unspecified laterality      Physician: Ashu Hoyt MD    Visit Date: 7/27/2020    Physician Orders: PT Eval and Treat  Medical Diagnosis from Referral: M54.5,G89.29 (ICD-10-CM) - Chronic low back pain without sciatica, unspecified back pain laterality    Evaluation Date: 6/12/2020  Authorization Period Expiration: 12/31/2020  Current Certification Period: 6/12/20202- 8/21/2020    Plan of Care Expiration: 8/21/2020  Visit # / Visits authorized: 8/ 104 (5/14 per POC, Freq Ev + 2W6, 1W2)    Total Visit count: 8        Re-assessment due by: 8/20/2020      Time In: 1715  Time Out: 1805      Precautions: Standard      Subjective     Pt reports: She has been doing fine and currently relates a 3/10 pain to her L hip.        She was compliant with home exercise program.    Response to previous treatment: as noted  Functional change: as noted      Pain: 0/10  Location: bilateral back        Objective       Meri received therapeutic exercises to develop strength, endurance, ROM, flexibility, posture and core stabilization for 40 minutes including:    · Recumbent bike L3 x 5 min  · Standing hip ABD isometrics on wall with swiss ball 3 sec holds 10 x 2 held  · Standing glute med hip hikes x 20 reps BLE held  · Quadruped Bilateral hip ER isometrics focusing on preventing psoas activation 3 x 30 sec each  · Quadruped Multifidi hikes 10 x 2  · TrA 5 sec holds x 3 minutes held   Focusing on isolating TrA from hip flexors   Quadruped alt UE with hip ER isometrics and mitigating psoas activation 10 x  each  · Education on improving posture- weight shifting over mid foot, activating TrA to prevent hanging on Y ligaments, and relaxing shoulders  · +Lateral lunges and then external rotation of extended leg 5 x 3  · +Star Lunge (combination of forward lunge/ lateral lunge and open hips and rear/side lunge 5 x 3  · +Lateral step lunges (hip to 90*, then position hip ~45* in transverse plane then step out to make heel contact) 5 x each ins sequence.        Lumbar stabiliazaation     Not performed:   · SL clams with PurpleTB 2 x 15 reps  · SL reverse clams GTB 2 x 15 reps   · HL ball squeezes with bridges x 3 minutes  · HL bridges with Purple band x 3 minutes  · Supine IT band stretch with rope 30 sec x 2  · Side stepping with GTB x 4 laps (discontinue next visit)  · Squat then standing hip abd x 10 reps (discontinue next visit)  ·           Meri received the following manual therapy techniques:N/A were applied to the: N/A for N/A minutes, including:N/A      Meri participated in neuromuscular re-education activities to improve:  for 0 minutes. The following activities were included:  N/A    Meri participated in dynamic functional therapeutic activities/physical performance testing to improve functional performance for 0 minutes, including:        Meri participated in gait training to improve functional mobility and safety for 0 minutes, including:  N/A    Meri received the following direct contact modalities after being cleared for contraindications:N/A    Meri received the following supervised modalities after being cleared for contradictions:      Meri received hot pack for 0 minutes to N/A.    Meri received cold pack for 0 minutes to N/A.      Home Exercises Provided and Patient Education Provided     Education provided:   - Advised pt to no longer let her hips pop out of place  - Avoid any exercise that will cause her hips to pop out of place    Written Home Exercises Provided:  Patient instructed to cont prior HEP. And updated exs with hip wall sub maximal isometrics, educated on mode frequency reps, and sets as well as when to stop TE.  Patient verbalzied understanding, performed return demonstration and with no adverse affects noted nor verbalized.      Exercises were reviewed and Meri was able to demonstrate them prior to the end of the session.  Meri demonstrated fair  understanding of the education provided.     See EMR under Media for exercises provided prior visit.    Assessment     Patient participated with PT to address her core and hip weakness.  She was able to otl her noted TE and progressions while requiring cueing for inhibiting her psoas and with increased lateral hip rotation and for improving strength of her hip lateal rotators.  She is expected to progress to her goals with cont PT Rx in order to optimize her pain reduced/free functional mob.        Meri is progressing well towards her goals.   Pt prognosis is Good.     Pt will continue to benefit from skilled outpatient physical therapy to address the deficits listed in the problem list box on initial evaluation, provide pt/family education and to maximize pt's level of independence in the home and community environment.     Pt's spiritual, cultural and educational needs considered and pt agreeable to plan of care and goals.       Anticipated barriers to physical therapy: None      Goals:     STG  Weeks/Visits Date Established  Goal Status    1.Pt will report at worst pain of </= 6/10 to improve quality of life and sleep quality  4 weeks  6/12/2020    Met 7/20/2020    2. Pt will report </= 25% disability based on DINO to improve ability to carry out activities  4 weeks  6/12/2020    In progress 7/27/2020      3.Pt will report >/= 50/80 on LEFS to improved participation in bike riding  4 weeks  6/12/2020    In progress 7/27/2020      4.Pt will increase BLE hip ER to >/= 35 deg to improve symmetry between BLEs  to improve functional mobility  4 weeks  6/12/2020    In progress 7/27/2020      5.Pt will demonstrate independence and compliance with HEP to improve therapy outcomes  4 weeks  6/12/2020    Met 7/20/2020       LTG Weeks/Visits Date Established  Goal Status    1.Pt will report at worst pain of </= 3/10 to improve quality of life and sleep quality  8 weeks  6/12/2020        2. Pt will report </= 15% disability based on DINO to improve ability to carry out activities  8 weeks  6/12/2020           3.Pt will report >/= 65/80 on LEFS to improved participation in bike riding  8 weeks  6/12/2020        4.Pt will increase BLE hip ER to >/= 40 deg to improve functional mobility  8 weeks  6/12/2020        5.Pt will increase BLE strength to >/= 4+/5 to improve participation in exercise 8 weeks  6/12/2020                   Plan        Cont with POC and advance as dago, possible. Progress hip stability there-ex in a pop/snap free range. Continue to work on isolating hip ER from hip flexors in quadruped position.       Ricki Caldwell, PT

## 2020-07-27 ENCOUNTER — CLINICAL SUPPORT (OUTPATIENT)
Dept: REHABILITATION | Facility: HOSPITAL | Age: 35
End: 2020-07-27
Payer: COMMERCIAL

## 2020-07-27 DIAGNOSIS — M25.859 HIP IMPINGEMENT SYNDROME, UNSPECIFIED LATERALITY: ICD-10-CM

## 2020-07-27 DIAGNOSIS — M25.651 DECREASED RANGE OF RIGHT HIP MOVEMENT: ICD-10-CM

## 2020-07-27 DIAGNOSIS — R29.898 DECREASED STRENGTH OF LOWER EXTREMITY: Primary | ICD-10-CM

## 2020-07-27 DIAGNOSIS — M53.3 SI (SACROILIAC) JOINT DYSFUNCTION: ICD-10-CM

## 2020-07-27 DIAGNOSIS — R29.898 IMPAIRED FLEXIBILITY OF LOWER EXTREMITY: ICD-10-CM

## 2020-07-27 PROCEDURE — 97110 THERAPEUTIC EXERCISES: CPT

## 2020-08-05 ENCOUNTER — DOCUMENTATION ONLY (OUTPATIENT)
Dept: REHABILITATION | Facility: HOSPITAL | Age: 35
End: 2020-08-05

## 2020-09-29 ENCOUNTER — DOCUMENTATION ONLY (OUTPATIENT)
Dept: REHABILITATION | Facility: HOSPITAL | Age: 35
End: 2020-09-29

## 2020-09-29 PROBLEM — R29.898 IMPAIRED FLEXIBILITY OF LOWER EXTREMITY: Status: RESOLVED | Noted: 2020-06-12 | Resolved: 2020-09-29

## 2020-09-29 PROBLEM — M25.659 DECREASED RANGE OF MOTION OF HIP: Status: RESOLVED | Noted: 2020-06-12 | Resolved: 2020-09-29

## 2020-09-29 PROBLEM — R29.898 DECREASED STRENGTH OF LOWER EXTREMITY: Status: RESOLVED | Noted: 2020-06-12 | Resolved: 2020-09-29

## 2020-09-29 PROBLEM — M25.859 IMPINGEMENT SYNDROME, HIP: Status: RESOLVED | Noted: 2020-06-12 | Resolved: 2020-09-29

## 2020-09-29 PROBLEM — M53.3 SI (SACROILIAC) JOINT DYSFUNCTION: Status: RESOLVED | Noted: 2020-06-12 | Resolved: 2020-09-29

## 2020-09-29 NOTE — PROGRESS NOTES
Outpatient Therapy Discharge Summary     Name: Meri BROWNING Wood  Clinic Number: [id number]    Therapy Diagnosis:        Encounter Diagnoses   Name Primary?    Decreased strength of lower extremity Yes    Decreased range of right hip movement      Impaired flexibility of lower extremity      SI (sacroiliac) joint dysfunction      Hip impingement syndrome, unspecified laterality        Physician: Ashu Hoyt MD     Physician Orders: PT Eval and Treat  Medical Diagnosis from Referral: M54.5,G89.29 (ICD-10-CM) - Chronic low back pain without sciatica, unspecified back pain laterality     Evaluation Date: 6/12/2020      Date of Last visit: 7/27/2020  Total Visits Received: 8      Assessment      Patient is a 36 y/o female with a MD Dx of Chronic low back pain without sciatica, unspecified back pain laterality and was evaluated on 6/12/2020 and had a total of eight visits with her most recent on 7/27/2020.  At that time she had met STG 1a and her remaining goals had not been met.  She is currently being discharged from PT as she has not returned.      Discharge reason: Patient has not attended therapy since 7/27/2020    Plan   This patient is discharged from Physical Therapy and is to cont follow up with her MD PRN.        Ricki Caldwell, PT

## 2020-12-02 ENCOUNTER — OFFICE VISIT (OUTPATIENT)
Dept: FAMILY MEDICINE | Facility: CLINIC | Age: 35
End: 2020-12-02
Payer: COMMERCIAL

## 2020-12-02 VITALS
BODY MASS INDEX: 25.71 KG/M2 | TEMPERATURE: 97 F | WEIGHT: 150.63 LBS | HEART RATE: 98 BPM | HEIGHT: 64 IN | SYSTOLIC BLOOD PRESSURE: 118 MMHG | DIASTOLIC BLOOD PRESSURE: 84 MMHG

## 2020-12-02 DIAGNOSIS — M25.552 CHRONIC PAIN OF BOTH HIPS: ICD-10-CM

## 2020-12-02 DIAGNOSIS — M25.551 BILATERAL HIP PAIN: Primary | ICD-10-CM

## 2020-12-02 DIAGNOSIS — M25.551 CHRONIC PAIN OF BOTH HIPS: ICD-10-CM

## 2020-12-02 DIAGNOSIS — Z13.89 SCREENING FOR BLOOD OR PROTEIN IN URINE: ICD-10-CM

## 2020-12-02 DIAGNOSIS — G89.29 CHRONIC PAIN OF BOTH HIPS: ICD-10-CM

## 2020-12-02 DIAGNOSIS — M25.552 BILATERAL HIP PAIN: Primary | ICD-10-CM

## 2020-12-02 DIAGNOSIS — Z79.899 LONG-TERM USE OF HIGH-RISK MEDICATION: ICD-10-CM

## 2020-12-02 DIAGNOSIS — J30.1 SEASONAL ALLERGIC RHINITIS DUE TO POLLEN: ICD-10-CM

## 2020-12-02 DIAGNOSIS — Z13.6 SCREENING FOR ISCHEMIC HEART DISEASE (IHD): ICD-10-CM

## 2020-12-02 DIAGNOSIS — K21.9 GASTROESOPHAGEAL REFLUX DISEASE WITHOUT ESOPHAGITIS: Primary | ICD-10-CM

## 2020-12-02 DIAGNOSIS — G43.709 CHRONIC MIGRAINE WITHOUT AURA WITHOUT STATUS MIGRAINOSUS, NOT INTRACTABLE: ICD-10-CM

## 2020-12-02 PROCEDURE — 99214 PR OFFICE/OUTPT VISIT, EST, LEVL IV, 30-39 MIN: ICD-10-PCS | Mod: S$GLB,,, | Performed by: FAMILY MEDICINE

## 2020-12-02 PROCEDURE — 99214 OFFICE O/P EST MOD 30 MIN: CPT | Mod: S$GLB,,, | Performed by: FAMILY MEDICINE

## 2020-12-02 PROCEDURE — 3008F PR BODY MASS INDEX (BMI) DOCUMENTED: ICD-10-PCS | Mod: S$GLB,,, | Performed by: FAMILY MEDICINE

## 2020-12-02 PROCEDURE — 3008F BODY MASS INDEX DOCD: CPT | Mod: S$GLB,,, | Performed by: FAMILY MEDICINE

## 2020-12-02 RX ORDER — SUMATRIPTAN 50 MG/1
50 TABLET, FILM COATED ORAL DAILY PRN
Qty: 9 TABLET | Refills: 5 | Status: SHIPPED | OUTPATIENT
Start: 2020-12-02 | End: 2021-06-09 | Stop reason: SDUPTHER
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
## 2020-12-07 ENCOUNTER — HOSPITAL ENCOUNTER (OUTPATIENT)
Dept: RADIOLOGY | Facility: HOSPITAL | Age: 35
Discharge: HOME | End: 2020-12-07
Attending: ORTHOPAEDIC SURGERY
Payer: COMMERCIAL

## 2020-12-07 ENCOUNTER — OFFICE VISIT (OUTPATIENT)
Dept: ORTHOPEDICS | Facility: CLINIC | Age: 35
End: 2020-12-07
Payer: COMMERCIAL

## 2020-12-07 VITALS — RESPIRATION RATE: 16 BRPM | BODY MASS INDEX: 25.61 KG/M2 | WEIGHT: 150 LBS | HEIGHT: 64 IN

## 2020-12-07 DIAGNOSIS — M25.552 BILATERAL HIP PAIN: ICD-10-CM

## 2020-12-07 DIAGNOSIS — M25.552 CHRONIC PAIN OF BOTH HIPS: ICD-10-CM

## 2020-12-07 DIAGNOSIS — M25.551 BILATERAL HIP PAIN: ICD-10-CM

## 2020-12-07 DIAGNOSIS — M24.852 BILATERAL SNAPPING HIPS: Primary | ICD-10-CM

## 2020-12-07 DIAGNOSIS — M25.551 CHRONIC PAIN OF BOTH HIPS: ICD-10-CM

## 2020-12-07 DIAGNOSIS — G89.29 CHRONIC PAIN OF BOTH HIPS: ICD-10-CM

## 2020-12-07 DIAGNOSIS — M24.851 BILATERAL SNAPPING HIPS: Primary | ICD-10-CM

## 2020-12-07 PROCEDURE — 99999 PR PBB SHADOW E&M-EST. PATIENT-LVL IV: ICD-10-PCS | Mod: PBBFAC,,, | Performed by: ORTHOPAEDIC SURGERY

## 2020-12-07 PROCEDURE — 99999 PR PBB SHADOW E&M-EST. PATIENT-LVL IV: CPT | Mod: PBBFAC,,, | Performed by: ORTHOPAEDIC SURGERY

## 2020-12-07 PROCEDURE — 1125F AMNT PAIN NOTED PAIN PRSNT: CPT | Mod: S$GLB,,, | Performed by: ORTHOPAEDIC SURGERY

## 2020-12-07 PROCEDURE — 3008F PR BODY MASS INDEX (BMI) DOCUMENTED: ICD-10-PCS | Mod: CPTII,S$GLB,, | Performed by: ORTHOPAEDIC SURGERY

## 2020-12-07 PROCEDURE — 99204 PR OFFICE/OUTPT VISIT, NEW, LEVL IV, 45-59 MIN: ICD-10-PCS | Mod: S$GLB,,, | Performed by: ORTHOPAEDIC SURGERY

## 2020-12-07 PROCEDURE — 99204 OFFICE O/P NEW MOD 45 MIN: CPT | Mod: S$GLB,,, | Performed by: ORTHOPAEDIC SURGERY

## 2020-12-07 PROCEDURE — 73521 X-RAY EXAM HIPS BI 2 VIEWS: CPT | Mod: TC,PN

## 2020-12-07 PROCEDURE — 1125F PR PAIN SEVERITY QUANTIFIED, PAIN PRESENT: ICD-10-PCS | Mod: S$GLB,,, | Performed by: ORTHOPAEDIC SURGERY

## 2020-12-07 PROCEDURE — 73521 XR HIPS BILATERAL 2 VIEW INCL AP PELVIS: ICD-10-PCS | Mod: 26,,, | Performed by: RADIOLOGY

## 2020-12-07 PROCEDURE — 73521 X-RAY EXAM HIPS BI 2 VIEWS: CPT | Mod: 26,,, | Performed by: RADIOLOGY

## 2020-12-07 PROCEDURE — 3008F BODY MASS INDEX DOCD: CPT | Mod: CPTII,S$GLB,, | Performed by: ORTHOPAEDIC SURGERY

## 2020-12-07 RX ORDER — MELOXICAM 15 MG/1
15 TABLET ORAL DAILY
Qty: 30 TABLET | Refills: 11 | Status: SHIPPED | OUTPATIENT
Start: 2020-12-07 | End: 2022-06-29

## 2020-12-07 RX ORDER — MELOXICAM 15 MG/1
15 TABLET ORAL DAILY
Qty: 30 TABLET | Refills: 11 | Status: CANCELLED | OUTPATIENT
Start: 2020-12-07

## 2020-12-07 NOTE — PROGRESS NOTES
CC:  35-year-old female presents for evaluation of bilateral hip pain.  The patient reports that she has had hip pain for a few years now.  The pain is deep and not really radiating.  It is beginning to affect her ability to sleep.  She also reports that she can not pop both of her hips and has been able to do that her whole life.  She currently rates her pain as a 2/10.  She has been to physical therapy but the then really no what they were doing with forward exercises to work on both her because of the reproducible snapping of the hips bilaterally.    ROS:    Constitution: Denies chills, fever, and sweats.  HENT: Denies headaches or blurry vision.  Cardiovascular: Denies chest pain or irregular heart beat.  Respiratory: Denies cough or shortness of breath.  Gastrointestinal: Denies abdominal pain, nausea, or vomiting.  Genitourinary:  Denies urinary incontinence, bladder and kidney issues  Musculoskeletal:  Denies muscle cramps.  Positive for bilateral hip pain  Neurological: Denies dizziness or focal weakness.  Psychiatric/Behavioral: Normal mental status.  Hematologic/Lymphatic: Denies bleeding problem or easy bruising/bleeding.  Skin: Denies rash or suspicious lesions.    Physical examination     Gen - No acute distress, well nourished, well groomed   Eyes - Extraoccular motions intact, pupils equally round and reactive to light and accommodation   ENT - normocephalic, atruamtic, oropharynx clear   Neck - Supple, no abnormal masses   Cardiovascular - regular rate and rhythm   Pulmonary - clear to auscultation bilaterally, no wheezes, ronchi, or rales   Abdomen - soft, non-tender, non-distended, positive bowel sounds   Psych - The patient is alert and oriented x3 with normal mood and affect    Examination of the Right Lower Extremity    Skin is intact throughout  Motor in intact EHL,FHL,TA,shree  +2 DP/PT  Sensation LT intact D/P/1st    Examination of the Right Hip    C-Sign negative  Logroll  negative  Stenchfield negative    Pain with ROM positive    ROM:    Flexion   120  Extension   30  Abduction   45  Adduction   20  External Rotation 45  Internal Rotation 35    Flexion contracture negative    FADIR negative  FADER negative    Tenderness to palpation over lateral and posterolateral greater tochanter positive    Examination of the Left Lower Extremity    Skin is intact throughout  Motor in intact EHL,FHL,TA,shree  +2 DP/PT  Sensation LT intact D/P/1st    Examination of the Left Hip    C-Sign negative  Logroll negative  Stenchfield negative    Pain with ROM positive    ROM:    Flexion   120  Extension   30  Abduction   45  Adduction   20  External Rotation 45  Internal Rotation 35    Flexion contracture negative    FADIR negative  FADER negative    Tenderness to palpation over lateral and posterolateral greater tochanter positive     When asked to reproduce the snapping or popping in her hips the patient is able to repetitively snap the ITB band over the greater trochanter bilaterally    X-ray images were examined and personally interpreted by me.  Three views of bilateral hips dated 12/07/2020 show the femoral head well reduced within the acetabuli.  Joint spaces are well maintained with no advanced arthritic changes.  No acute fractures.    Dx:  Bilateral external snapping hip syndrome    Plan:  I discussed with the patient the difference between internal and external snapping hip.  I think now we can focus her therapy a little better to work on stretching out her ITB band bilaterally.  I am also going to start her on a once a day anti-inflammatory just to cool off chronic inflammation and have her follow up in 1 month.    Answers for HPI/ROS submitted by the patient on 12/6/2020   Hip pain  unexpected weight change: No  appetite change : No  sleep disturbance: Yes  IMMUNOCOMPROMISED: No  nervous/ anxious: No  dysphoric mood: No  rash: No  visual disturbance: No  eye redness: No  eye pain: No  ear  pain: No  tinnitus: No  hearing loss: No  sinus pressure : No  nosebleeds: No  enviro allergies: No  food allergies: No  cough: No  shortness of breath: No  sweating: No  dysuria: No  frequency: No  difficulty urinating: No  hematuria: No  painful intercourse: No  chest pain: No  palpitations: No  nausea: No  vomiting: No  diarrhea: No  blood in stool: No  constipation: No  headaches: Yes  dizziness: No  numbness: No  seizures: No  joint swelling: No  myalgia: No  weakness: No  back pain: No  Pain Chronicity: chronic  History of trauma: No  Onset: more than 1 year ago  Frequency: constantly  Progression since onset: gradually worsening  injury location: at home  pain- numeric: 3/10  pain location: groin, left hip, right hip, other  pain quality: aching  Radiating Pain: No  Aggravating factors: bending, coughing, exercise, standing, lifting, lying down, sitting  fever: No  inability to bear weight: No  itching: No  joint locking: No  limited range of motion: Yes  stiffness: Yes  tingling: No  Treatments tried: cold, heat, movement, NSAIDs, rest  physical therapy: ineffective  Improvement on treatment: no relief

## 2020-12-07 NOTE — LETTER
December 7, 2020      Ashu Hoyt MD  [address]  [address]  [facility]  [place] [address]           [facility]  [place] [address]  Phone: [phone]          Patient: Meri Krishna   MR Number: [id number]   YOB: 1985   Date of Visit: 12/7/2020       Dear Dr. Ashu Hoyt:    Thank you for referring Meri Krishna to me for evaluation. Attached you will find relevant portions of my assessment and plan of care.    If you have questions, please do not hesitate to call me. I look forward to following Meri Krishna along with you.    Sincerely,    Wilver Tatum II, MD    Enclosure  CC:  No Recipients    If you would like to receive this communication electronically, please contact [email or web address] or [phone] to request more information on [employer] Link access.    For providers and/or their staff who would like to refer a patient to [facility], please contact us through our one-stop-shop provider referral line, [facility], at [phone].    If you feel you have received this communication in error or would no longer like to receive these types of communications, please e-mail [email or web address]

## 2021-01-19 ENCOUNTER — TELEPHONE (OUTPATIENT)
Dept: FAMILY MEDICINE | Facility: CLINIC | Age: 36
End: 2021-01-19

## 2021-05-19 ENCOUNTER — TELEPHONE (OUTPATIENT)
Dept: FAMILY MEDICINE | Facility: CLINIC | Age: 36
End: 2021-05-19

## 2021-05-26 ENCOUNTER — TELEPHONE (OUTPATIENT)
Dept: FAMILY MEDICINE | Facility: CLINIC | Age: 36
End: 2021-05-26

## 2021-06-09 ENCOUNTER — OFFICE VISIT (OUTPATIENT)
Dept: FAMILY MEDICINE | Facility: CLINIC | Age: 36
End: 2021-06-09
Payer: COMMERCIAL

## 2021-06-09 VITALS
DIASTOLIC BLOOD PRESSURE: 78 MMHG | HEIGHT: 64 IN | HEART RATE: 76 BPM | BODY MASS INDEX: 26.29 KG/M2 | OXYGEN SATURATION: 99 % | WEIGHT: 154 LBS | SYSTOLIC BLOOD PRESSURE: 118 MMHG

## 2021-06-09 DIAGNOSIS — J30.1 SEASONAL ALLERGIC RHINITIS DUE TO POLLEN: ICD-10-CM

## 2021-06-09 DIAGNOSIS — M71.379 SYNOVIAL CYST OF ANKLE AND FOOT REGION: ICD-10-CM

## 2021-06-09 DIAGNOSIS — K21.9 GASTROESOPHAGEAL REFLUX DISEASE WITHOUT ESOPHAGITIS: Primary | ICD-10-CM

## 2021-06-09 DIAGNOSIS — G43.709 CHRONIC MIGRAINE WITHOUT AURA WITHOUT STATUS MIGRAINOSUS, NOT INTRACTABLE: ICD-10-CM

## 2021-06-09 PROCEDURE — 3008F PR BODY MASS INDEX (BMI) DOCUMENTED: ICD-10-PCS | Mod: S$GLB,,, | Performed by: FAMILY MEDICINE

## 2021-06-09 PROCEDURE — 3074F PR MOST RECENT SYSTOLIC BLOOD PRESSURE < 130 MM HG: ICD-10-PCS | Mod: S$GLB,,, | Performed by: FAMILY MEDICINE

## 2021-06-09 PROCEDURE — 3008F BODY MASS INDEX DOCD: CPT | Mod: S$GLB,,, | Performed by: FAMILY MEDICINE

## 2021-06-09 PROCEDURE — 3074F SYST BP LT 130 MM HG: CPT | Mod: S$GLB,,, | Performed by: FAMILY MEDICINE

## 2021-06-09 PROCEDURE — 3078F PR MOST RECENT DIASTOLIC BLOOD PRESSURE < 80 MM HG: ICD-10-PCS | Mod: S$GLB,,, | Performed by: FAMILY MEDICINE

## 2021-06-09 PROCEDURE — 99214 PR OFFICE/OUTPT VISIT, EST, LEVL IV, 30-39 MIN: ICD-10-PCS | Mod: S$GLB,,, | Performed by: FAMILY MEDICINE

## 2021-06-09 PROCEDURE — 3078F DIAST BP <80 MM HG: CPT | Mod: S$GLB,,, | Performed by: FAMILY MEDICINE

## 2021-06-09 PROCEDURE — 99214 OFFICE O/P EST MOD 30 MIN: CPT | Mod: S$GLB,,, | Performed by: FAMILY MEDICINE

## 2021-06-09 RX ORDER — SUMATRIPTAN 50 MG/1
50 TABLET, FILM COATED ORAL DAILY PRN
Qty: 9 TABLET | Refills: 5 | Status: SHIPPED | OUTPATIENT
Start: 2021-06-09 | End: 2021-12-08 | Stop reason: SDUPTHER

## 2021-06-11 ENCOUNTER — TELEPHONE (OUTPATIENT)
Dept: FAMILY MEDICINE | Facility: CLINIC | Age: 36
End: 2021-06-11

## 2021-06-15 ENCOUNTER — HOSPITAL ENCOUNTER (OUTPATIENT)
Dept: RADIOLOGY | Facility: CLINIC | Age: 36
Discharge: HOME | End: 2021-06-15
Attending: PODIATRIST
Payer: COMMERCIAL

## 2021-06-15 ENCOUNTER — OFFICE VISIT (OUTPATIENT)
Dept: PODIATRY | Facility: CLINIC | Age: 36
End: 2021-06-15
Payer: COMMERCIAL

## 2021-06-15 VITALS — WEIGHT: 154 LBS | HEIGHT: 64 IN | BODY MASS INDEX: 26.29 KG/M2 | HEART RATE: 90 BPM | OXYGEN SATURATION: 96 %

## 2021-06-15 DIAGNOSIS — M79.672 LEFT FOOT PAIN: ICD-10-CM

## 2021-06-15 DIAGNOSIS — M67.472 GANGLION CYST OF LEFT FOOT: Primary | ICD-10-CM

## 2021-06-15 PROCEDURE — 73630 X-RAY EXAM OF FOOT: CPT | Mod: LT,S$GLB,, | Performed by: RADIOLOGY

## 2021-06-15 PROCEDURE — 99203 OFFICE O/P NEW LOW 30 MIN: CPT | Mod: S$GLB,,, | Performed by: PODIATRIST

## 2021-06-15 PROCEDURE — 99203 PR OFFICE/OUTPT VISIT, NEW, LEVL III, 30-44 MIN: ICD-10-PCS | Mod: S$GLB,,, | Performed by: PODIATRIST

## 2021-06-15 PROCEDURE — 73630 XR FOOT COMPLETE 3 VIEW LEFT: ICD-10-PCS | Mod: LT,S$GLB,, | Performed by: RADIOLOGY

## 2021-06-15 PROCEDURE — 1125F AMNT PAIN NOTED PAIN PRSNT: CPT | Mod: S$GLB,,, | Performed by: PODIATRIST

## 2021-06-15 PROCEDURE — 3008F BODY MASS INDEX DOCD: CPT | Mod: CPTII,S$GLB,, | Performed by: PODIATRIST

## 2021-06-15 PROCEDURE — 1125F PR PAIN SEVERITY QUANTIFIED, PAIN PRESENT: ICD-10-PCS | Mod: S$GLB,,, | Performed by: PODIATRIST

## 2021-06-15 PROCEDURE — 3008F PR BODY MASS INDEX (BMI) DOCUMENTED: ICD-10-PCS | Mod: CPTII,S$GLB,, | Performed by: PODIATRIST

## 2021-12-08 ENCOUNTER — OFFICE VISIT (OUTPATIENT)
Dept: FAMILY MEDICINE | Facility: CLINIC | Age: 36
End: 2021-12-08
Payer: COMMERCIAL

## 2021-12-08 ENCOUNTER — TELEPHONE (OUTPATIENT)
Dept: FAMILY MEDICINE | Facility: CLINIC | Age: 36
End: 2021-12-08

## 2021-12-08 VITALS
DIASTOLIC BLOOD PRESSURE: 80 MMHG | HEART RATE: 97 BPM | SYSTOLIC BLOOD PRESSURE: 126 MMHG | BODY MASS INDEX: 25.95 KG/M2 | OXYGEN SATURATION: 99 % | HEIGHT: 64 IN | WEIGHT: 152 LBS

## 2021-12-08 DIAGNOSIS — M25.552 CHRONIC PAIN OF BOTH HIPS: ICD-10-CM

## 2021-12-08 DIAGNOSIS — K21.9 GASTROESOPHAGEAL REFLUX DISEASE WITHOUT ESOPHAGITIS: Primary | ICD-10-CM

## 2021-12-08 DIAGNOSIS — Z13.6 SCREENING FOR ISCHEMIC HEART DISEASE (IHD): ICD-10-CM

## 2021-12-08 DIAGNOSIS — G89.29 CHRONIC PAIN OF BOTH HIPS: ICD-10-CM

## 2021-12-08 DIAGNOSIS — G43.709 CHRONIC MIGRAINE WITHOUT AURA WITHOUT STATUS MIGRAINOSUS, NOT INTRACTABLE: ICD-10-CM

## 2021-12-08 DIAGNOSIS — M25.551 CHRONIC PAIN OF BOTH HIPS: ICD-10-CM

## 2021-12-08 DIAGNOSIS — Z13.29 SCREENING FOR ENDOCRINE DISORDER: ICD-10-CM

## 2021-12-08 DIAGNOSIS — J30.1 SEASONAL ALLERGIC RHINITIS DUE TO POLLEN: ICD-10-CM

## 2021-12-08 DIAGNOSIS — Z79.899 LONG-TERM USE OF HIGH-RISK MEDICATION: Primary | ICD-10-CM

## 2021-12-08 DIAGNOSIS — Z13.89 SCREENING FOR BLOOD OR PROTEIN IN URINE: ICD-10-CM

## 2021-12-08 PROCEDURE — 99214 PR OFFICE/OUTPT VISIT, EST, LEVL IV, 30-39 MIN: ICD-10-PCS | Mod: S$GLB,,, | Performed by: FAMILY MEDICINE

## 2021-12-08 PROCEDURE — 99214 OFFICE O/P EST MOD 30 MIN: CPT | Mod: S$GLB,,, | Performed by: FAMILY MEDICINE

## 2021-12-08 RX ORDER — UBROGEPANT 50 MG/1
50 TABLET ORAL ONCE AS NEEDED
Qty: 1 TABLET | Refills: 0 | COMMUNITY
Start: 2021-12-08 | End: 2021-12-08

## 2021-12-08 RX ORDER — SUMATRIPTAN 50 MG/1
50 TABLET, FILM COATED ORAL DAILY PRN
Qty: 9 TABLET | Refills: 5 | Status: SHIPPED | OUTPATIENT
Start: 2021-12-08 | End: 2022-08-01 | Stop reason: SDUPTHER

## 2021-12-09 ENCOUNTER — PATIENT MESSAGE (OUTPATIENT)
Dept: FAMILY MEDICINE | Facility: CLINIC | Age: 36
End: 2021-12-09
Payer: COMMERCIAL

## 2021-12-14 ENCOUNTER — TELEPHONE (OUTPATIENT)
Dept: FAMILY MEDICINE | Facility: CLINIC | Age: 36
End: 2021-12-14
Payer: COMMERCIAL

## 2022-01-20 ENCOUNTER — TELEPHONE (OUTPATIENT)
Dept: FAMILY MEDICINE | Facility: CLINIC | Age: 37
End: 2022-01-20
Payer: COMMERCIAL

## 2022-01-20 ENCOUNTER — PATIENT MESSAGE (OUTPATIENT)
Dept: FAMILY MEDICINE | Facility: CLINIC | Age: 37
End: 2022-01-20
Payer: COMMERCIAL

## 2022-01-20 RX ORDER — UBROGEPANT 50 MG/1
50 TABLET ORAL ONCE AS NEEDED
Qty: 10 TABLET | Refills: 2 | Status: SHIPPED | OUTPATIENT
Start: 2022-01-20 | End: 2022-01-20

## 2022-01-20 RX ORDER — TOPIRAMATE 25 MG/1
TABLET ORAL
Qty: 60 TABLET | Refills: 2 | Status: SHIPPED | OUTPATIENT
Start: 2022-01-20 | End: 2022-08-01

## 2022-01-20 RX ORDER — UBROGEPANT 50 MG/1
50 TABLET ORAL ONCE AS NEEDED
Qty: 10 TABLET | Refills: 2 | Status: CANCELLED | OUTPATIENT
Start: 2022-01-20 | End: 2022-01-20

## 2022-01-20 NOTE — TELEPHONE ENCOUNTER
The patient's prescription has been approved and sent to   [id number] - [place], [place] - [address] [address]  Phone: [phone] Fax: [phone]

## 2022-01-20 NOTE — TELEPHONE ENCOUNTER
Ok to try PA on ubrelvy, she is having chronic daily headaches and migraines. Has tried imitrex, now on topamax.(preventative)

## 2022-05-27 ENCOUNTER — TELEPHONE (OUTPATIENT)
Dept: FAMILY MEDICINE | Facility: CLINIC | Age: 37
End: 2022-05-27

## 2022-06-22 ENCOUNTER — HOSPITAL ENCOUNTER (EMERGENCY)
Facility: HOSPITAL | Age: 37
Discharge: HOME | End: 2022-06-22
Attending: EMERGENCY MEDICINE
Payer: COMMERCIAL

## 2022-06-22 VITALS
BODY MASS INDEX: 27.46 KG/M2 | RESPIRATION RATE: 18 BRPM | WEIGHT: 155 LBS | DIASTOLIC BLOOD PRESSURE: 76 MMHG | OXYGEN SATURATION: 97 % | TEMPERATURE: 99 F | HEIGHT: 63 IN | HEART RATE: 78 BPM | SYSTOLIC BLOOD PRESSURE: 126 MMHG

## 2022-06-22 DIAGNOSIS — R10.9 FLANK PAIN: ICD-10-CM

## 2022-06-22 DIAGNOSIS — N20.0 NEPHROLITHIASIS: Primary | ICD-10-CM

## 2022-06-22 LAB
ALBUMIN SERPL BCP-MCNC: 4.3 G/DL (ref 3.5–5.2)
ALP SERPL-CCNC: 46 U/L (ref 55–135)
ALT SERPL W/O P-5'-P-CCNC: 21 U/L (ref 10–44)
ANION GAP SERPL CALC-SCNC: 11 MMOL/L (ref 8–16)
AST SERPL-CCNC: 22 U/L (ref 10–40)
B-HCG UR QL: NEGATIVE
BASOPHILS # BLD AUTO: 0.06 K/UL (ref 0–0.2)
BASOPHILS NFR BLD: 0.6 % (ref 0–1.9)
BILIRUB SERPL-MCNC: 0.5 MG/DL (ref 0.1–1)
BILIRUB UR QL STRIP: NEGATIVE
BUN SERPL-MCNC: 17 MG/DL (ref 6–20)
CALCIUM SERPL-MCNC: 9.6 MG/DL (ref 8.7–10.5)
CHLORIDE SERPL-SCNC: 103 MMOL/L (ref 95–110)
CLARITY UR: CLEAR
CO2 SERPL-SCNC: 22 MMOL/L (ref 23–29)
COLOR UR: COLORLESS
CREAT SERPL-MCNC: 0.9 MG/DL (ref 0.5–1.4)
CTP QC/QA: YES
DIFFERENTIAL METHOD: NORMAL
EOSINOPHIL # BLD AUTO: 0.1 K/UL (ref 0–0.5)
EOSINOPHIL NFR BLD: 0.8 % (ref 0–8)
ERYTHROCYTE [DISTWIDTH] IN BLOOD BY AUTOMATED COUNT: 12.8 % (ref 11.5–14.5)
EST. GFR  (AFRICAN AMERICAN): >60 ML/MIN/1.73 M^2
EST. GFR  (NON AFRICAN AMERICAN): >60 ML/MIN/1.73 M^2
GLUCOSE SERPL-MCNC: 95 MG/DL (ref 70–110)
GLUCOSE UR QL STRIP: NEGATIVE
HCT VFR BLD AUTO: 38 % (ref 37–48.5)
HGB BLD-MCNC: 13.2 G/DL (ref 12–16)
HGB UR QL STRIP: NEGATIVE
IMM GRANULOCYTES # BLD AUTO: 0.02 K/UL (ref 0–0.04)
IMM GRANULOCYTES NFR BLD AUTO: 0.2 % (ref 0–0.5)
KETONES UR QL STRIP: NEGATIVE
LEUKOCYTE ESTERASE UR QL STRIP: NEGATIVE
LIPASE SERPL-CCNC: 30 U/L (ref 4–60)
LYMPHOCYTES # BLD AUTO: 3.8 K/UL (ref 1–4.8)
LYMPHOCYTES NFR BLD: 37.2 % (ref 18–48)
MCH RBC QN AUTO: 30.1 PG (ref 27–31)
MCHC RBC AUTO-ENTMCNC: 34.7 G/DL (ref 32–36)
MCV RBC AUTO: 87 FL (ref 82–98)
MONOCYTES # BLD AUTO: 0.8 K/UL (ref 0.3–1)
MONOCYTES NFR BLD: 8.1 % (ref 4–15)
NEUTROPHILS # BLD AUTO: 5.4 K/UL (ref 1.8–7.7)
NEUTROPHILS NFR BLD: 53.1 % (ref 38–73)
NITRITE UR QL STRIP: NEGATIVE
NRBC BLD-RTO: 0 /100 WBC
PH UR STRIP: 7 [PH] (ref 5–8)
PLATELET # BLD AUTO: 355 K/UL (ref 150–450)
PMV BLD AUTO: 9.9 FL (ref 9.2–12.9)
POTASSIUM SERPL-SCNC: 3.4 MMOL/L (ref 3.5–5.1)
PROT SERPL-MCNC: 7.5 G/DL (ref 6–8.4)
PROT UR QL STRIP: NEGATIVE
RBC # BLD AUTO: 4.38 M/UL (ref 4–5.4)
SODIUM SERPL-SCNC: 136 MMOL/L (ref 136–145)
SP GR UR STRIP: 1.01 (ref 1–1.03)
URN SPEC COLLECT METH UR: ABNORMAL
UROBILINOGEN UR STRIP-ACNC: NEGATIVE EU/DL
WBC # BLD AUTO: 10.14 K/UL (ref 3.9–12.7)

## 2022-06-22 PROCEDURE — 96374 THER/PROPH/DIAG INJ IV PUSH: CPT

## 2022-06-22 PROCEDURE — 85025 COMPLETE CBC W/AUTO DIFF WBC: CPT | Performed by: PHYSICIAN ASSISTANT

## 2022-06-22 PROCEDURE — 96376 TX/PRO/DX INJ SAME DRUG ADON: CPT | Mod: 59

## 2022-06-22 PROCEDURE — 63600175 PHARM REV CODE 636 W HCPCS: Performed by: PHYSICIAN ASSISTANT

## 2022-06-22 PROCEDURE — 99285 EMERGENCY DEPT VISIT HI MDM: CPT | Mod: 25

## 2022-06-22 PROCEDURE — 96375 TX/PRO/DX INJ NEW DRUG ADDON: CPT | Mod: 59

## 2022-06-22 PROCEDURE — 25000003 PHARM REV CODE 250: Performed by: EMERGENCY MEDICINE

## 2022-06-22 PROCEDURE — 63600175 PHARM REV CODE 636 W HCPCS: Performed by: EMERGENCY MEDICINE

## 2022-06-22 PROCEDURE — 25500020 PHARM REV CODE 255: Performed by: EMERGENCY MEDICINE

## 2022-06-22 PROCEDURE — 81025 URINE PREGNANCY TEST: CPT | Performed by: PHYSICIAN ASSISTANT

## 2022-06-22 PROCEDURE — 81003 URINALYSIS AUTO W/O SCOPE: CPT | Performed by: PHYSICIAN ASSISTANT

## 2022-06-22 PROCEDURE — 80053 COMPREHEN METABOLIC PANEL: CPT | Performed by: PHYSICIAN ASSISTANT

## 2022-06-22 PROCEDURE — 25000003 PHARM REV CODE 250: Performed by: PHYSICIAN ASSISTANT

## 2022-06-22 PROCEDURE — 96361 HYDRATE IV INFUSION ADD-ON: CPT

## 2022-06-22 PROCEDURE — 83690 ASSAY OF LIPASE: CPT | Performed by: PHYSICIAN ASSISTANT

## 2022-06-22 RX ORDER — KETOROLAC TROMETHAMINE 30 MG/ML
15 INJECTION, SOLUTION INTRAMUSCULAR; INTRAVENOUS
Status: COMPLETED | OUTPATIENT
Start: 2022-06-22 | End: 2022-06-22

## 2022-06-22 RX ORDER — HYDROCODONE BITARTRATE AND ACETAMINOPHEN 5; 325 MG/1; MG/1
1 TABLET ORAL EVERY 4 HOURS PRN
Qty: 12 TABLET | Refills: 0 | Status: SHIPPED | OUTPATIENT
Start: 2022-06-22 | End: 2022-08-01

## 2022-06-22 RX ORDER — POTASSIUM CHLORIDE 20 MEQ/1
40 TABLET, EXTENDED RELEASE ORAL
Status: COMPLETED | OUTPATIENT
Start: 2022-06-22 | End: 2022-06-22

## 2022-06-22 RX ORDER — HYDROMORPHONE HYDROCHLORIDE 1 MG/ML
1 INJECTION, SOLUTION INTRAMUSCULAR; INTRAVENOUS; SUBCUTANEOUS
Status: COMPLETED | OUTPATIENT
Start: 2022-06-22 | End: 2022-06-22

## 2022-06-22 RX ORDER — ONDANSETRON 2 MG/ML
4 INJECTION INTRAMUSCULAR; INTRAVENOUS
Status: COMPLETED | OUTPATIENT
Start: 2022-06-22 | End: 2022-06-22

## 2022-06-22 RX ORDER — ONDANSETRON 4 MG/1
4 TABLET, ORALLY DISINTEGRATING ORAL EVERY 8 HOURS PRN
Qty: 10 TABLET | Refills: 0 | Status: SHIPPED | OUTPATIENT
Start: 2022-06-22

## 2022-06-22 RX ORDER — HYDROCODONE BITARTRATE AND ACETAMINOPHEN 5; 325 MG/1; MG/1
1 TABLET ORAL EVERY 4 HOURS PRN
Qty: 12 TABLET | Refills: 0 | Status: SHIPPED | OUTPATIENT
Start: 2022-06-22 | End: 2022-06-22 | Stop reason: SDUPTHER

## 2022-06-22 RX ADMIN — KETOROLAC TROMETHAMINE 15 MG: 30 INJECTION, SOLUTION INTRAMUSCULAR; INTRAVENOUS at 05:06

## 2022-06-22 RX ADMIN — SODIUM CHLORIDE 1000 ML: 0.9 INJECTION, SOLUTION INTRAVENOUS at 07:06

## 2022-06-22 RX ADMIN — POTASSIUM CHLORIDE 40 MEQ: 1500 TABLET, EXTENDED RELEASE ORAL at 07:06

## 2022-06-22 RX ADMIN — ONDANSETRON 4 MG: 2 INJECTION INTRAMUSCULAR; INTRAVENOUS at 06:06

## 2022-06-22 RX ADMIN — KETOROLAC TROMETHAMINE 15 MG: 30 INJECTION, SOLUTION INTRAMUSCULAR; INTRAVENOUS at 09:06

## 2022-06-22 RX ADMIN — HYDROMORPHONE HYDROCHLORIDE 1 MG: 1 INJECTION, SOLUTION INTRAMUSCULAR; INTRAVENOUS; SUBCUTANEOUS at 06:06

## 2022-06-22 RX ADMIN — IOHEXOL 75 ML: 350 INJECTION, SOLUTION INTRAVENOUS at 08:06

## 2022-06-22 NOTE — FIRST PROVIDER EVALUATION
Emergency Department TeleTriage Encounter Note      CHIEF COMPLAINT    Chief Complaint   Patient presents with    Flank Pain     Right flank pain x 2 days. Denies dysuria, hematuria.        VITAL SIGNS   Initial Vitals [06/22/22 1622]   BP Pulse Resp Temp SpO2   (!) 166/96 99 18 98 °F (36.7 °C) 99 %      MAP       --            ALLERGIES    Review of patient's allergies indicates:   Allergen Reactions    Penicillins Hives, Shortness Of Breath and Swelling    Yeast        PROVIDER TRIAGE NOTE    37-year-old female presents to ER for evaluation of a right-sided flank pain for the last 24 hours.  Reports the pain radiates to the groin.  No history of kidney stones     PE:  Patient alert and oriented. No acute distress. Appears in pain.    Initial orders will be placed and care will be transferred to an alternate provider when patient is roomed for a full evaluation. Any additional orders and the final disposition will be determined by that provider.    Disclaimer: This note has been generated using voice-recognition software. There may be typographical errors that have been missed during proof-reading.        ORDERS  Labs Reviewed   URINALYSIS, REFLEX TO URINE CULTURE   CBC W/ AUTO DIFFERENTIAL   COMPREHENSIVE METABOLIC PANEL   LIPASE   POCT URINE PREGNANCY       ED Orders (720h ago, onward)    Start Ordered     Status Ordering Provider    06/22/22 1715 06/22/22 1700  sodium chloride 0.9% bolus 1,000 mL  ED 1 Time         Ordered ESTER GUTIERRES    06/22/22 1715 06/22/22 1700  ketorolac injection 15 mg  ED 1 Time         Ordered ESTER GUTIERRES    06/22/22 1701 06/22/22 1700  Urinalysis, Reflex to Urine Culture Urine, Clean Catch  STAT         Ordered ESTER GUTIERRES    06/22/22 1701 06/22/22 1700  CBC auto differential  STAT         Ordered NENITA ESTER    06/22/22 1701 06/22/22 1700  Comprehensive metabolic panel  STAT         Ordered NENITA ESTER    06/22/22 1701 06/22/22 1700  Lipase  STAT          Ordered NBAYKUTTY, ESTER    06/22/22 1701 06/22/22 1700  Insert Saline lock IV  Once         Ordered JOHNYKUTTY, ESTER    06/22/22 1700 06/22/22 1700  POCT urine pregnancy  Once         Ordered ESTER GUTIERRES            Virtual Visit Note: The provider triage portion of this emergency department evaluation and documentation was performed via [employer], a HIPAA-compliant telemedicine application, in concert with a tele-presenter in the room. A face to face patient evaluation with one of my colleagues will occur once the patient is placed in an emergency department room.      DISCLAIMER: This note was prepared with [employer] voice recognition transcription software. Garbled syntax, mangled pronouns, and other bizarre constructions may be attributed to that software system.

## 2022-06-22 NOTE — ED PROVIDER NOTES
Encounter Date: 6/22/2022       History     Chief Complaint   Patient presents with    Flank Pain     Right flank pain x 2 days. Denies dysuria, hematuria.      Patient here with reported right flank pain radiating to her right lower abdomen and onset yesterday no dysuria urgency or frequency she has had some nausea with the pain she denies any vomiting she denies any fever chills no cough no antecedent illnesses noted she denies any previous history of similar symptoms and few weeks ago she had a brief episode of pain he does report that she has a family history of kidney stones but no family history of blood clots        Review of patient's allergies indicates:   Allergen Reactions    Penicillins Hives, Shortness Of Breath and Swelling    Yeast      Past Medical History:   Diagnosis Date    Hypertension     Migraine      Past Surgical History:   Procedure Laterality Date    CYST REMOVAL Right 2005     Family History   Problem Relation Age of Onset    Hypertension Mother     Obesity Mother     Diabetes Father     Hypertension Father     Hyperlipidemia Father     Stroke Father     Lung cancer Maternal Grandfather      Social History     Tobacco Use    Smoking status: Never Smoker    Smokeless tobacco: Never Used   Substance Use Topics    Alcohol use: Yes     Comment: rarely    Drug use: Never     Review of Systems   Constitutional: Negative for appetite change, chills and fever.   Respiratory: Negative for cough, chest tightness and shortness of breath.    Cardiovascular: Negative for chest pain.   Gastrointestinal: Positive for abdominal pain.   Genitourinary: Positive for flank pain. Negative for dysuria, frequency and urgency.   All other systems reviewed and are negative.      Physical Exam     Initial Vitals [06/22/22 1622]   BP Pulse Resp Temp SpO2   (!) 166/96 99 18 98 °F (36.7 °C) 99 %      MAP       --         Physical Exam    Constitutional: She appears well-developed and well-nourished.  No distress.   HENT:   Head: Normocephalic and atraumatic.   Right Ear: External ear normal.   Left Ear: External ear normal.   Mouth/Throat: Oropharynx is clear and moist.   Eyes: Conjunctivae and EOM are normal. Pupils are equal, round, and reactive to light.   Neck: Neck supple.   Normal range of motion.  Cardiovascular: Normal rate, regular rhythm, normal heart sounds and intact distal pulses.   Pulmonary/Chest: Breath sounds normal. No respiratory distress.   Abdominal: Abdomen is soft. Bowel sounds are normal. There is no abdominal tenderness.   Musculoskeletal:         General: No edema. Normal range of motion.      Cervical back: Normal range of motion and neck supple.     Neurological: She is alert and oriented to person, place, and time. GCS score is 15. GCS eye subscore is 4. GCS verbal subscore is 5. GCS motor subscore is 6.   Skin: Skin is warm and dry. Capillary refill takes less than 2 seconds. No rash noted.   Psychiatric: She has a normal mood and affect. Her behavior is normal.         ED Course   Procedures  Labs Reviewed   URINALYSIS, REFLEX TO URINE CULTURE - Abnormal; Notable for the following components:       Result Value    Color, UA Colorless (*)     All other components within normal limits    Narrative:     Specimen Source->Urine   COMPREHENSIVE METABOLIC PANEL - Abnormal; Notable for the following components:    Potassium 3.4 (*)     CO2 22 (*)     Alkaline Phosphatase 46 (*)     All other components within normal limits   CBC W/ AUTO DIFFERENTIAL   LIPASE   POCT URINE PREGNANCY          Imaging Results          CTA Chest Non-Coronary - PE Study (Final result)  Result time 06/22/22 21:35:08    Final result by French Butler MD (06/22/22 21:35:08)                 Narrative:    CLINICAL INDICATION: Pulmonary embolism (PE) suspected, high prob    COMPARISON: CT abdomen/pelvis same day.    TECHNIQUE: Multiple contiguous axial CT images of the chest were obtained during the administration  of intravenous contrast per CT angiogram protocol. 3D reconstructions were also performed. No sagittal or coronal 2-D reconstructions.    CONTRAST: 75 mL Omnipaque 350.    FINDINGS:  The lungs are clear except for some atelectatic change at the lung bases. The thoracic aorta is normal in appearance. The heart borderline in size. There are no pleural or pericardial effusions. No mediastinal or hilar adenopathy.    The pulmonary arteries are well-opacified. No pulmonary emboli are demonstrated.    Bilateral breast implants are noted. There is a partially included cyst in the upper right abdomen which on recent CT scan was in the right kidney. Please see CT abdomen pelvis report for discussion.      Impression:  Negative for pulmonary embolism. No acute chest finding.    This exam was performed according to our departmental dose-optimization program which includes use of Automated Exposure Control, adjustment of the mA and/or kV according to patient size and/or use of iterative reconstruction technique.    Electronically signed by:  French Butler MD  6/22/2022 9:35 PM CDT Workstation: [id number]                             CT Renal Stone Study ABD Pelvis WO (Final result)  Result time 06/22/22 19:02:08    Final result by Pepe Jackson MD (06/22/22 19:02:08)                 Narrative:    CMS MANDATED QUALITY DATA-CT RADIATION DOSE-436  All CT scans at this facility use dose modulation, iterative reconstruction, and or weight-based dosing when appropriate to reduce radiation dose to as low as reasonably achievable.    HISTORY: Right flank pain, kidney stone suspected    FINDINGS: Noncontrast axial images were obtained. Nonenhanced study is tailored for the detection of urolithiasis, and is insensitive for abnormalities of the solid organs, vasculature and hollow viscera.  No prior studies for comparison.    CT ABDOMEN: The lung bases are clear. The unenhanced liver and gallbladder are unremarkable, with no calcified  gallstones. The unenhanced spleen is normal in size and is unremarkable, with the unenhanced pancreas and adrenal glands unremarkable.    There are small bilateral nonobstructing renal calculi measuring up to 2-3 mm, with no ureteral calculi or hydroureteronephrosis. Hypoattenuating right renal lesion is consistent with simple cysts, with minimal peripheral calcification. The abdominal aorta and iliac arteries are normal in caliber.    There is no bowel obstruction, ascites, or intraperitoneal free air. The appendix is normal. There is a tiny fat-containing umbilical hernia.    CT PELVIS: There are no distal ureteral or bladder calculi, with several calcified pelvic phleboliths. The unenhanced rectum, uterus, adnexa and urinary bladder are unremarkable, with no pelvic free fluid or mass, and no enlarged pelvic or inguinal lymph nodes.    The unenhanced extraperitoneal soft tissues are unremarkable. There are no acute fractures or destructive osseous lesions, with intervertebral disc space narrowing the lower lumbar spine.    IMPRESSION:  1. Small bilateral nonobstructing renal calculi, with no ureteral calculi or hydroureteronephrosis.  2. Normal appendix.    Electronically signed by:  Pepe Jackson MD  6/22/2022 7:02 PM CDT Workstation: [id number]                               Medications   sodium chloride 0.9% bolus 1,000 mL (0 mLs Intravenous Stopped 6/22/22 2124)   ketorolac injection 15 mg (15 mg Intravenous Given 6/22/22 1712)   HYDROmorphone injection 1 mg (1 mg Intravenous Given 6/22/22 1839)   ondansetron injection 4 mg (4 mg Intravenous Given 6/22/22 1839)   potassium chloride SA CR tablet 40 mEq (40 mEq Oral Given 6/22/22 1940)   iohexoL (OMNIPAQUE 350) injection 75 mL (75 mLs Intravenous Given 6/22/22 2051)   ketorolac injection 15 mg (15 mg Intravenous Given 6/22/22 2133)     Medical Decision Making:   ED Management:  Laboratory evaluation reviewed patient's CT scan does show evidence of nephrolithiasis  without obstructing ureteral calculi there is no evidence of pulmonary embolism will treat as likely recently passed stone or stone were unable to see on CT scan Norco Zofran outpatient follow-up with Urology return to ER for any worsened symptoms or new symptoms                      Clinical Impression:   Final diagnoses:  [N20.0] Nephrolithiasis (Primary)  [R10.9] Flank pain          ED Disposition Condition    Discharge Stable        ED Prescriptions     Medication Sig Dispense Start Date End Date Auth. Provider    HYDROcodone-acetaminophen (NORCO) 5-325 mg per tablet  (Status: Discontinued) Take 1 tablet by mouth every 4 (four) hours as needed for Pain. 12 tablet 6/22/2022 6/22/2022 Philip Fletcher MD    ondansetron (ZOFRAN-ODT) 4 MG TbDL Take 1 tablet (4 mg total) by mouth every 8 (eight) hours as needed. 10 tablet 6/22/2022  Philip Fletcher MD    HYDROcodone-acetaminophen (NORCO) 5-325 mg per tablet Take 1 tablet by mouth every 4 (four) hours as needed for Pain. 12 tablet 6/22/2022  Philip Fletcher MD        Follow-up Information     Follow up With Specialties Details Why Contact Info    Ashu Hoyt MD Family Medicine In 1 day for re-examination of your symptoms [address] [facility] [address]  [address]  [facility]  [place] [address]  [phone]      Betzaida Tellez MD Urology Call in 1 day for re-examination of your symptoms [address] [place] [address]  [address]  [place] [address]  [phone]             Philip Fletcher MD  06/22/22 2155       Philip Fletcher MD  07/01/22 5330

## 2022-06-24 ENCOUNTER — TELEPHONE (OUTPATIENT)
Dept: UROLOGY | Facility: CLINIC | Age: 37
End: 2022-06-24
Payer: COMMERCIAL

## 2022-06-24 NOTE — TELEPHONE ENCOUNTER
----- Message from Elvia Pace sent at 6/24/2022  4:18 PM CDT -----  Contact: Patient  Type: Patient Call Back         Who called: Patient         What is the request in detail: calling to get sched for a NP appt; states she was seen at [facility] and discharge papers had a referral attach; for kidney stones/cysts; please advise         Can the clinic reply by [facility]? yes         Would the patient rather a call back or a response via [facility]? call         Best call back number: [phone]         Additional Information: NP; BLUE CROSS BLUE SHIELD/BCBS ALL OUT OF STATE; prefers earliest or latest appt           Thank You

## 2022-06-28 NOTE — PROGRESS NOTES
CHIEF COMPLAINT:    Mrs Krishna is a 37 y.o. female presenting for kidney stones.  PRESENTING ILLNESS:    Meri Krishna is a 37 y.o. female with a PMH of HTN who presents for kidney stones. This is her initial clinic visit.    Patient was seen in ED 6/22/22 for right flank pain. CT RSS resulted small bilateral nonobstructing renal calculi measuring up to 2-3 mm, with no ureteral calculi or hydroureteronephrosis. Hypoattenuating right renal lesion is consistent with simple cysts, with minimal peripheral calcification.  UA: colorless, negative  Creatinine 0.9    Today patient presents to clinic for kidney stones. Patient denies prior hx of kidney stones. She reports on 6/10/22, she had a sharp pain that occurred from right rib area to right hip. Pain resolved after a few hours. On 6/22/22, pain returned and lasted over 24 hours, which is when she presented to ED. She rates pain a 3/10 and with deep breath pain worsened to 10/10. CT done in ED, which resulted kidney stones, no ureteral stone or hydronephrosis. Patient denies dysuria,  gross hematuria, flank pain, fever, chills, nausea or vomiting. Denies frequency or urgency. Pain is intermittent today.    Drinks: lots of water, no soda or tea  Constipation: denies    History of kidney stones: no prior hx, patient was unaware she had kidney stones until CT completed  History of recurrent UTI: none  Personal or family hx of  malignancy: none  History of  trauma: none  Smoking history: never smoked      Urine cultures:   No results found for: LABURIN      REVIEW OF SYSTEMS:    Review of Systems    Constitutional: Negative for fever and chills.   HENT: Negative for hearing loss.   Eyes: Negative for visual disturbance.   Respiratory: Negative for shortness of breath.   Cardiovascular: Negative for chest pain.   Gastrointestinal: Negative for nausea, vomiting, and constipation.   Genitourinary:  See above  Neurological: Negative for dizziness.   Hematological: Does not  bruise/bleed easily.   Psychiatric/Behavioral: Negative for confusion.     PATIENT HISTORY:    Past Medical History:   Diagnosis Date    Hypertension     Kidney stone     Migraine        Past Surgical History:   Procedure Laterality Date    CYST REMOVAL Right 2005       Family History   Problem Relation Age of Onset    Hypertension Mother     Obesity Mother     Diabetes Father     Hypertension Father     Hyperlipidemia Father     Stroke Father     Lung cancer Maternal Grandfather        Social History     Socioeconomic History    Marital status:    Tobacco Use    Smoking status: Never Smoker    Smokeless tobacco: Never Used   Substance and Sexual Activity    Alcohol use: Yes     Comment: rarely    Drug use: Never       Allergies:  Penicillins and Yeast    Medications:    Current Outpatient Medications:     biotin 1,000 mcg Chew, Take by mouth., Disp: , Rfl:     drospirenone-e.estradioL-lm.FA (BEYAZ/LYDIA) 3-0.02-0.451 mg (24) (4) Tab, , Disp: , Rfl:     HYDROcodone-acetaminophen (NORCO) 5-325 mg per tablet, Take 1 tablet by mouth every 4 (four) hours as needed for Pain., Disp: 12 tablet, Rfl: 0    omeprazole (PRILOSEC) 20 MG capsule, Take 20 mg by mouth once daily., Disp: , Rfl:     ondansetron (ZOFRAN-ODT) 4 MG TbDL, Take 1 tablet (4 mg total) by mouth every 8 (eight) hours as needed., Disp: 10 tablet, Rfl: 0    spironolactone (ALDACTONE) 50 MG tablet, Take 50 mg by mouth 2 (two) times daily., Disp: , Rfl:     sumatriptan (IMITREX) 50 MG tablet, Take 1 tablet (50 mg total) by mouth daily as needed for Migraine. May repeat in 2 hours if needed. Max 100mg/24 hrs, Disp: 9 tablet, Rfl: 5    topiramate (TOPAMAX) 25 MG tablet, Take one tablet once daily for 1 week then 1 tablet twice daily, Disp: 60 tablet, Rfl: 2    ubrogepant (UBROGEPANT) 50 mg tablet, Take 50 mg by mouth once as needed for Migraine., Disp: , Rfl:     PHYSICAL EXAMINATION:    Constitutional: She is oriented to person,  place, and time. She appears well-developed and well-nourished.  She is in no apparent distress.    Neck: Normal ROM.     Cardiovascular: Normal rate.      Pulmonary/Chest: Effort normal. No respiratory distress.     Abdominal:  She exhibits no distension.  There is no CVA tenderness.     Neurological: She is alert and oriented to person, place, and time.     Skin: Skin is warm and dry.     Psych: Cooperative with normal affect.      Physical Exam      LABS:    U/a: sp grav 1.020, pH 6.5, negative    Lab Results   Component Value Date    CREATININE 0.9 06/22/2022       IMPRESSION:    Encounter Diagnoses   Name Primary?    Kidney stone Yes       PLAN:  -Discussed CT finding and negative UA and no voiding issues, including dysuria or GH. Unlikely, pain is r/t kidney stone. No ureteral stone or obstructing stone. F/u with PCP regarding right sided pain.  -Will continue to monitor kidney stones with renal US and KUB.    -General risk factors for kidney stones and the conservative measures to prevent kidney stones in the future were discussed with the patient in detail.  The patient was encouraged to drink 2-3 liters of water a day, limit iced tea and annamarie as well as foods high in oxalate.  They were cautioned to try to limit salt and red meat intake. Low oxalate diet (limit spinach, rhubarb, nuts, beets, potatoes, chocolate). We also discussed adding citrate to the diet with the addition of efra or lemon juice to their water or alternatively with crystal light.   -Get prompt medical attention if any of the following occur:  · Severe pain that returns and not relieved by pain medicines  · Repeated vomiting or unable to keep down fluids  · Weakness, dizziness or fainting  · Fever of 101.4ºF (38ºC) or higher, or as directed by your healthcare provider  · Blood clots in urine  · Foul smelling or cloudy urine  · Unable to pass urine for 8 hours or increasing bladder pressure  -RTC 6 months with Dr. Tellez with renal  US and GREG prior      I encouraged her or any of her family members to call or email me with questions and/or concerns.      45 minutes of total time spent on the encounter, which includes face to face time and non-face to face time preparing to see the patient (eg, review of tests), Obtaining and/or reviewing separately obtained history, Documenting clinical information in the electronic or other health record, Independently interpreting results (not separately reported) and communicating results to the patient/family/caregiver, or Care coordination (not separately reported).

## 2022-06-29 ENCOUNTER — OFFICE VISIT (OUTPATIENT)
Dept: UROLOGY | Facility: CLINIC | Age: 37
End: 2022-06-29
Payer: COMMERCIAL

## 2022-06-29 VITALS
HEIGHT: 63 IN | HEART RATE: 84 BPM | BODY MASS INDEX: 28.13 KG/M2 | WEIGHT: 158.75 LBS | SYSTOLIC BLOOD PRESSURE: 138 MMHG | RESPIRATION RATE: 18 BRPM | DIASTOLIC BLOOD PRESSURE: 88 MMHG

## 2022-06-29 DIAGNOSIS — N20.0 KIDNEY STONE: Primary | ICD-10-CM

## 2022-06-29 LAB
BILIRUB SERPL-MCNC: NORMAL MG/DL
BLOOD URINE, POC: NORMAL
CLARITY, POC UA: CLEAR
COLOR, POC UA: YELLOW
GLUCOSE UR QL STRIP: NORMAL
KETONES UR QL STRIP: NORMAL
LEUKOCYTE ESTERASE URINE, POC: NORMAL
NITRITE, POC UA: NORMAL
PH, POC UA: 6.5
PROTEIN, POC: NORMAL
SPECIFIC GRAVITY, POC UA: 1.02
UROBILINOGEN, POC UA: 0.2

## 2022-06-29 PROCEDURE — 1159F MED LIST DOCD IN RCRD: CPT | Mod: CPTII,S$GLB,, | Performed by: NURSE PRACTITIONER

## 2022-06-29 PROCEDURE — 81002 POCT URINE DIPSTICK WITHOUT MICROSCOPE: ICD-10-PCS | Mod: S$GLB,,, | Performed by: NURSE PRACTITIONER

## 2022-06-29 PROCEDURE — 3008F PR BODY MASS INDEX (BMI) DOCUMENTED: ICD-10-PCS | Mod: CPTII,S$GLB,, | Performed by: NURSE PRACTITIONER

## 2022-06-29 PROCEDURE — 99999 PR PBB SHADOW E&M-EST. PATIENT-LVL V: CPT | Mod: PBBFAC,,, | Performed by: NURSE PRACTITIONER

## 2022-06-29 PROCEDURE — 3008F BODY MASS INDEX DOCD: CPT | Mod: CPTII,S$GLB,, | Performed by: NURSE PRACTITIONER

## 2022-06-29 PROCEDURE — 3079F PR MOST RECENT DIASTOLIC BLOOD PRESSURE 80-89 MM HG: ICD-10-PCS | Mod: CPTII,S$GLB,, | Performed by: NURSE PRACTITIONER

## 2022-06-29 PROCEDURE — 81002 URINALYSIS NONAUTO W/O SCOPE: CPT | Mod: S$GLB,,, | Performed by: NURSE PRACTITIONER

## 2022-06-29 PROCEDURE — 3075F PR MOST RECENT SYSTOLIC BLOOD PRESS GE 130-139MM HG: ICD-10-PCS | Mod: CPTII,S$GLB,, | Performed by: NURSE PRACTITIONER

## 2022-06-29 PROCEDURE — 3079F DIAST BP 80-89 MM HG: CPT | Mod: CPTII,S$GLB,, | Performed by: NURSE PRACTITIONER

## 2022-06-29 PROCEDURE — 99204 OFFICE O/P NEW MOD 45 MIN: CPT | Mod: S$GLB,,, | Performed by: NURSE PRACTITIONER

## 2022-06-29 PROCEDURE — 1160F PR REVIEW ALL MEDS BY PRESCRIBER/CLIN PHARMACIST DOCUMENTED: ICD-10-PCS | Mod: CPTII,S$GLB,, | Performed by: NURSE PRACTITIONER

## 2022-06-29 PROCEDURE — 1160F RVW MEDS BY RX/DR IN RCRD: CPT | Mod: CPTII,S$GLB,, | Performed by: NURSE PRACTITIONER

## 2022-06-29 PROCEDURE — 99999 PR PBB SHADOW E&M-EST. PATIENT-LVL V: ICD-10-PCS | Mod: PBBFAC,,, | Performed by: NURSE PRACTITIONER

## 2022-06-29 PROCEDURE — 1159F PR MEDICATION LIST DOCUMENTED IN MEDICAL RECORD: ICD-10-PCS | Mod: CPTII,S$GLB,, | Performed by: NURSE PRACTITIONER

## 2022-06-29 PROCEDURE — 99204 PR OFFICE/OUTPT VISIT, NEW, LEVL IV, 45-59 MIN: ICD-10-PCS | Mod: S$GLB,,, | Performed by: NURSE PRACTITIONER

## 2022-06-29 PROCEDURE — 3075F SYST BP GE 130 - 139MM HG: CPT | Mod: CPTII,S$GLB,, | Performed by: NURSE PRACTITIONER

## 2022-06-29 RX ORDER — SPIRONOLACTONE 50 MG/1
50 TABLET, FILM COATED ORAL 2 TIMES DAILY
COMMUNITY
Start: 2022-05-02

## 2022-06-29 RX ORDER — UBROGEPANT 50 MG/1
50 TABLET ORAL ONCE AS NEEDED
COMMUNITY
End: 2022-08-01

## 2022-06-29 NOTE — PATIENT INSTRUCTIONS
-The patient was encouraged to drink 2-3 liters of water a day, limit iced tea and annamarie as well as foods high in oxalate.  They were cautioned to try to limit salt and red meat intake. Low oxalate diet (limit spinach, rhubarb, nuts, beets, potatoes, chocolate). We also discussed adding citrate to the diet with the addition of efra or lemon juice to their water or alternatively with crystal light.     -Get prompt medical attention if any of the following occur:  Severe pain that returns and not relieved by pain medicines  Repeated vomiting or unable to keep down fluids  Weakness, dizziness or fainting  Fever of 101.4ºF (38ºC) or higher, or as directed by your healthcare provider  Blood clots in urine  Foul smelling or cloudy urine  Unable to pass urine for 8 hours or increasing bladder pressure

## 2022-07-27 LAB
ALBUMIN SERPL-MCNC: 4.5 G/DL (ref 3.6–5.1)
ALBUMIN/GLOB SERPL: 1.6 (CALC) (ref 1–2.5)
ALP SERPL-CCNC: 46 U/L (ref 31–125)
ALT SERPL-CCNC: 12 U/L (ref 6–29)
APPEARANCE UR: CLEAR
AST SERPL-CCNC: 18 U/L (ref 10–30)
BACTERIA #/AREA URNS HPF: NORMAL /HPF
BACTERIA UR CULT: NORMAL
BASOPHILS # BLD AUTO: 48 CELLS/UL (ref 0–200)
BASOPHILS NFR BLD AUTO: 0.6 %
BILIRUB SERPL-MCNC: 0.3 MG/DL (ref 0.2–1.2)
BILIRUB UR QL STRIP: NEGATIVE
BUN SERPL-MCNC: 19 MG/DL (ref 7–25)
BUN/CREAT SERPL: NORMAL (CALC) (ref 6–22)
CALCIUM SERPL-MCNC: 10 MG/DL (ref 8.6–10.2)
CHLORIDE SERPL-SCNC: 103 MMOL/L (ref 98–110)
CHOLEST SERPL-MCNC: 240 MG/DL
CHOLEST/HDLC SERPL: 3.2 (CALC)
CO2 SERPL-SCNC: 24 MMOL/L (ref 20–32)
COLOR UR: YELLOW
CREAT SERPL-MCNC: 0.9 MG/DL (ref 0.5–0.97)
EGFR: 83 ML/MIN/1.73M2
EOSINOPHIL # BLD AUTO: 40 CELLS/UL (ref 15–500)
EOSINOPHIL NFR BLD AUTO: 0.5 %
ERYTHROCYTE [DISTWIDTH] IN BLOOD BY AUTOMATED COUNT: 12.6 % (ref 11–15)
GLOBULIN SER CALC-MCNC: 2.9 G/DL (CALC) (ref 1.9–3.7)
GLUCOSE SERPL-MCNC: 92 MG/DL (ref 65–99)
GLUCOSE UR QL STRIP: NEGATIVE
HCT VFR BLD AUTO: 39.9 % (ref 35–45)
HDLC SERPL-MCNC: 75 MG/DL
HGB BLD-MCNC: 13.6 G/DL (ref 11.7–15.5)
HGB UR QL STRIP: NEGATIVE
HYALINE CASTS #/AREA URNS LPF: NORMAL /LPF
KETONES UR QL STRIP: NEGATIVE
LDLC SERPL CALC-MCNC: 133 MG/DL (CALC)
LEUKOCYTE ESTERASE UR QL STRIP: NEGATIVE
LYMPHOCYTES # BLD AUTO: 1936 CELLS/UL (ref 850–3900)
LYMPHOCYTES NFR BLD AUTO: 24.2 %
MCH RBC QN AUTO: 30.5 PG (ref 27–33)
MCHC RBC AUTO-ENTMCNC: 34.1 G/DL (ref 32–36)
MCV RBC AUTO: 89.5 FL (ref 80–100)
MONOCYTES # BLD AUTO: 616 CELLS/UL (ref 200–950)
MONOCYTES NFR BLD AUTO: 7.7 %
NEUTROPHILS # BLD AUTO: 5360 CELLS/UL (ref 1500–7800)
NEUTROPHILS NFR BLD AUTO: 67 %
NITRITE UR QL STRIP: NEGATIVE
NONHDLC SERPL-MCNC: 165 MG/DL (CALC)
PH UR STRIP: 6.5 [PH] (ref 5–8)
PLATELET # BLD AUTO: 353 THOUSAND/UL (ref 140–400)
PMV BLD REES-ECKER: 10.4 FL (ref 7.5–12.5)
POTASSIUM SERPL-SCNC: 4.1 MMOL/L (ref 3.5–5.3)
PROT SERPL-MCNC: 7.4 G/DL (ref 6.1–8.1)
PROT UR QL STRIP: NEGATIVE
RBC # BLD AUTO: 4.46 MILLION/UL (ref 3.8–5.1)
RBC #/AREA URNS HPF: NORMAL /HPF
SERVICE CMNT-IMP: NORMAL
SODIUM SERPL-SCNC: 137 MMOL/L (ref 135–146)
SP GR UR STRIP: 1.02 (ref 1–1.03)
SQUAMOUS #/AREA URNS HPF: NORMAL /HPF
TRIGL SERPL-MCNC: 183 MG/DL
TSH SERPL-ACNC: 1.04 MIU/L
WBC # BLD AUTO: 8 THOUSAND/UL (ref 3.8–10.8)
WBC #/AREA URNS HPF: NORMAL /HPF

## 2022-08-01 ENCOUNTER — OFFICE VISIT (OUTPATIENT)
Dept: FAMILY MEDICINE | Facility: CLINIC | Age: 37
End: 2022-08-01
Payer: COMMERCIAL

## 2022-08-01 VITALS
DIASTOLIC BLOOD PRESSURE: 74 MMHG | BODY MASS INDEX: 28.53 KG/M2 | SYSTOLIC BLOOD PRESSURE: 124 MMHG | HEIGHT: 63 IN | WEIGHT: 161 LBS

## 2022-08-01 DIAGNOSIS — M25.551 CHRONIC PAIN OF BOTH HIPS: ICD-10-CM

## 2022-08-01 DIAGNOSIS — J30.1 SEASONAL ALLERGIC RHINITIS DUE TO POLLEN: Primary | ICD-10-CM

## 2022-08-01 DIAGNOSIS — G43.709 CHRONIC MIGRAINE WITHOUT AURA WITHOUT STATUS MIGRAINOSUS, NOT INTRACTABLE: ICD-10-CM

## 2022-08-01 DIAGNOSIS — N20.0 NEPHROLITHIASIS: ICD-10-CM

## 2022-08-01 DIAGNOSIS — K21.9 GASTROESOPHAGEAL REFLUX DISEASE WITHOUT ESOPHAGITIS: ICD-10-CM

## 2022-08-01 DIAGNOSIS — G89.29 CHRONIC PAIN OF BOTH HIPS: ICD-10-CM

## 2022-08-01 DIAGNOSIS — M25.552 CHRONIC PAIN OF BOTH HIPS: ICD-10-CM

## 2022-08-01 PROCEDURE — 3078F PR MOST RECENT DIASTOLIC BLOOD PRESSURE < 80 MM HG: ICD-10-PCS | Mod: CPTII,S$GLB,, | Performed by: FAMILY MEDICINE

## 2022-08-01 PROCEDURE — 1159F PR MEDICATION LIST DOCUMENTED IN MEDICAL RECORD: ICD-10-PCS | Mod: CPTII,S$GLB,, | Performed by: FAMILY MEDICINE

## 2022-08-01 PROCEDURE — 3074F PR MOST RECENT SYSTOLIC BLOOD PRESSURE < 130 MM HG: ICD-10-PCS | Mod: CPTII,S$GLB,, | Performed by: FAMILY MEDICINE

## 2022-08-01 PROCEDURE — 3078F DIAST BP <80 MM HG: CPT | Mod: CPTII,S$GLB,, | Performed by: FAMILY MEDICINE

## 2022-08-01 PROCEDURE — 1160F PR REVIEW ALL MEDS BY PRESCRIBER/CLIN PHARMACIST DOCUMENTED: ICD-10-PCS | Mod: CPTII,S$GLB,, | Performed by: FAMILY MEDICINE

## 2022-08-01 PROCEDURE — 99214 OFFICE O/P EST MOD 30 MIN: CPT | Mod: S$GLB,,, | Performed by: FAMILY MEDICINE

## 2022-08-01 PROCEDURE — 1159F MED LIST DOCD IN RCRD: CPT | Mod: CPTII,S$GLB,, | Performed by: FAMILY MEDICINE

## 2022-08-01 PROCEDURE — 3008F BODY MASS INDEX DOCD: CPT | Mod: CPTII,S$GLB,, | Performed by: FAMILY MEDICINE

## 2022-08-01 PROCEDURE — 1160F RVW MEDS BY RX/DR IN RCRD: CPT | Mod: CPTII,S$GLB,, | Performed by: FAMILY MEDICINE

## 2022-08-01 PROCEDURE — 3074F SYST BP LT 130 MM HG: CPT | Mod: CPTII,S$GLB,, | Performed by: FAMILY MEDICINE

## 2022-08-01 PROCEDURE — 99214 PR OFFICE/OUTPT VISIT, EST, LEVL IV, 30-39 MIN: ICD-10-PCS | Mod: S$GLB,,, | Performed by: FAMILY MEDICINE

## 2022-08-01 PROCEDURE — 3008F PR BODY MASS INDEX (BMI) DOCUMENTED: ICD-10-PCS | Mod: CPTII,S$GLB,, | Performed by: FAMILY MEDICINE

## 2022-08-01 RX ORDER — SUMATRIPTAN 50 MG/1
50 TABLET, FILM COATED ORAL DAILY PRN
Qty: 9 TABLET | Refills: 5 | Status: SHIPPED | OUTPATIENT
Start: 2022-08-01 | End: 2023-03-03 | Stop reason: SDUPTHER

## 2022-08-01 RX ORDER — LEVOTHYROXINE, LIOTHYRONINE 19; 4.5 UG/1; UG/1
30 TABLET ORAL EVERY MORNING
COMMUNITY
Start: 2022-07-12 | End: 2023-03-03

## 2022-08-01 NOTE — PROGRESS NOTES
SUBJECTIVE:    Patient ID: Meri Krishna is a 37 y.o. female.    Chief Complaint: Gastroesophageal Reflux (Allergies, migraines, went over meds verbally// SW)    Pt here to checkup on acute and chronic conditions (HTN, GERD, and Migraine)    BP is doing ok today. Denies CP/SOB. Checks BP regularly, has been better at home.     Found out in June that she has kidney stones. Following Dr. Tellez every 6 months. Still has some left, told should pass without problems. They do run in the family. Has a Rx of 12 hydrocodone in case she has pain as well as some zofran. Also has a simple renal cyst on the right. Also has a tiny umbilical hernia.    Migraines are improved. Took about 3 months after having COVID for them to go back to baseline. Never did start the topamax. Still taking tylenol or ibuprofen rarely for headaches as it doesn't do much. Takes imitrex or ubrelvy as it works.    Since COVID, still having fatigue and brain fog. Good at the beginning of the day, but then by the middle of the day gets a full body fatigue, but not like sleepy.  The brain fog is like she cant find the words she wants to say. It is not every day but is often enough. It is better if she is rested.    Denies heartburn.  Has to take prilosec daily.  Can miss a day or two but it will return    Has seasonal allergies, which are not too bad right now, takes zyrtec as needed.    Fells her lower back and hips daily. Tries to stretch daily. Worse depending on what she does. Uses heating pad or tiger balm on the lower back when needed. Dx snapping hip (Tatum).   Not taking mobic. off and on for bursitis bilaterally and possibly some SI joint dysfunction.  Last PT was over a year.  Does spin class a few times a week for exercise.      Went to podiatrist, told cyst on her left foot is a ganglion cyst.  It is in a bad spot. Has chosen not to do anything about it.     Had labs done: TSH 1.04, fBS 95, GFR>60, , , k  4.1  -------------------------------------  Pap done in 8/2021yearly, hx abnormal pap 8 years ago with +HPV.   Has been doing biote' (Clavin) for 6/2021 due to low testosterone. Feeling better, helps with achiness.      Office Visit on 06/29/2022   Component Date Value Ref Range Status    Color, UA 06/29/2022 Yellow   Final    pH, UA 06/29/2022 6.5   Final    WBC, UA 06/29/2022 neg   Final    Nitrite, UA 06/29/2022 neg   Final    Protein, POC 06/29/2022 neg   Final    Glucose, UA 06/29/2022 neg   Final    Ketones, UA 06/29/2022 neg   Final    Urobilinogen, UA 06/29/2022 0.2   Final    Bilirubin, POC 06/29/2022 neg   Final    Blood, UA 06/29/2022 neg   Final    Clarity, UA 06/29/2022 Clear   Final    Spec Grav UA 06/29/2022 1.020   Final   Admission on 06/22/2022, Discharged on 06/22/2022   Component Date Value Ref Range Status    POC Preg Test, Ur 06/22/2022 Negative  Negative Final     Acceptable 06/22/2022 Yes   Final    Specimen UA 06/22/2022 Urine, Clean Catch   Final    Color, UA 06/22/2022 Colorless (A) Yellow, Straw, Cayla Final    Appearance, UA 06/22/2022 Clear  Clear Final    pH, UA 06/22/2022 7.0  5.0 - 8.0 Final    Specific Gravity, UA 06/22/2022 1.010  1.005 - 1.030 Final    Protein, UA 06/22/2022 Negative  Negative Final    Comment: Recommend a 24 hour urine protein or a urine   protein/creatinine ratio if globulin induced proteinuria is  clinically suspected.      Glucose, UA 06/22/2022 Negative  Negative Final    Ketones, UA 06/22/2022 Negative  Negative Final    Bilirubin (UA) 06/22/2022 Negative  Negative Final    Occult Blood UA 06/22/2022 Negative  Negative Final    Nitrite, UA 06/22/2022 Negative  Negative Final    Urobilinogen, UA 06/22/2022 Negative  Negative EU/dL Final    Leukocytes, UA 06/22/2022 Negative  Negative Final    WBC 06/22/2022 10.14  3.90 - 12.70 K/uL Final    RBC 06/22/2022 4.38  4.00 - 5.40 M/uL Final    Hemoglobin 06/22/2022 13.2   12.0 - 16.0 g/dL Final    Hematocrit 06/22/2022 38.0  37.0 - 48.5 % Final    MCV 06/22/2022 87  82 - 98 fL Final    MCH 06/22/2022 30.1  27.0 - 31.0 pg Final    MCHC 06/22/2022 34.7  32.0 - 36.0 g/dL Final    RDW 06/22/2022 12.8  11.5 - 14.5 % Final    Platelets 06/22/2022 355  150 - 450 K/uL Final    MPV 06/22/2022 9.9  9.2 - 12.9 fL Final    Immature Granulocytes 06/22/2022 0.2  0.0 - 0.5 % Final    Gran # (ANC) 06/22/2022 5.4  1.8 - 7.7 K/uL Final    Immature Grans (Abs) 06/22/2022 0.02  0.00 - 0.04 K/uL Final    Comment: Mild elevation in immature granulocytes is non specific and   can be seen in a variety of conditions including stress response,   acute inflammation, trauma and pregnancy. Correlation with other   laboratory and clinical findings is essential.      Lymph # 06/22/2022 3.8  1.0 - 4.8 K/uL Final    Mono # 06/22/2022 0.8  0.3 - 1.0 K/uL Final    Eos # 06/22/2022 0.1  0.0 - 0.5 K/uL Final    Baso # 06/22/2022 0.06  0.00 - 0.20 K/uL Final    nRBC 06/22/2022 0  0 /100 WBC Final    Gran % 06/22/2022 53.1  38.0 - 73.0 % Final    Lymph % 06/22/2022 37.2  18.0 - 48.0 % Final    Mono % 06/22/2022 8.1  4.0 - 15.0 % Final    Eosinophil % 06/22/2022 0.8  0.0 - 8.0 % Final    Basophil % 06/22/2022 0.6  0.0 - 1.9 % Final    Differential Method 06/22/2022 Automated   Final    Sodium 06/22/2022 136  136 - 145 mmol/L Final    Potassium 06/22/2022 3.4 (A) 3.5 - 5.1 mmol/L Final    Chloride 06/22/2022 103  95 - 110 mmol/L Final    CO2 06/22/2022 22 (A) 23 - 29 mmol/L Final    Glucose 06/22/2022 95  70 - 110 mg/dL Final    BUN 06/22/2022 17  6 - 20 mg/dL Final    Creatinine 06/22/2022 0.9  0.5 - 1.4 mg/dL Final    Calcium 06/22/2022 9.6  8.7 - 10.5 mg/dL Final    Total Protein 06/22/2022 7.5  6.0 - 8.4 g/dL Final    Albumin 06/22/2022 4.3  3.5 - 5.2 g/dL Final    Total Bilirubin 06/22/2022 0.5  0.1 - 1.0 mg/dL Final    Comment: For infants and newborns, interpretation of results should  be based  on gestational age, weight and in agreement with clinical  observations.    Premature Infant recommended reference ranges:  Up to 24 hours.............<8.0 mg/dL  Up to 48 hours............<12.0 mg/dL  3-5 days..................<15.0 mg/dL  6-29 days.................<15.0 mg/dL      Alkaline Phosphatase 06/22/2022 46 (A) 55 - 135 U/L Final    AST 06/22/2022 22  10 - 40 U/L Final    ALT 06/22/2022 21  10 - 44 U/L Final    Anion Gap 06/22/2022 11  8 - 16 mmol/L Final    eGFR if African American 06/22/2022 >60.0  >60 mL/min/1.73 m^2 Final    eGFR if non African American 06/22/2022 >60.0  >60 mL/min/1.73 m^2 Final    Comment: Calculation used to obtain the estimated glomerular filtration  rate (eGFR) is the CKD-EPI equation.       Lipase 06/22/2022 30  4 - 60 U/L Final   Telephone on 12/08/2021   Component Date Value Ref Range Status    Glucose 07/26/2022 92  65 - 99 mg/dL Final    Comment:               Fasting reference interval         BUN 07/26/2022 19  7 - 25 mg/dL Final    Creatinine 07/26/2022 0.90  0.50 - 0.97 mg/dL Final    EGFR 07/26/2022 83  > OR = 60 mL/min/1.73m2 Final    Comment: The eGFR is based on the CKD-EPI 2021 equation. To calculate   the new eGFR from a previous Creatinine or Cystatin C  result, go to https://www.kidney.org/professionals/  kdoqi/gfr%5Fcalculator      BUN/Creatinine Ratio 07/26/2022 NOT APPLICABLE  6 - 22 (calc) Final    Sodium 07/26/2022 137  135 - 146 mmol/L Final    Potassium 07/26/2022 4.1  3.5 - 5.3 mmol/L Final    Chloride 07/26/2022 103  98 - 110 mmol/L Final    CO2 07/26/2022 24  20 - 32 mmol/L Final    Calcium 07/26/2022 10.0  8.6 - 10.2 mg/dL Final    Total Protein 07/26/2022 7.4  6.1 - 8.1 g/dL Final    Albumin 07/26/2022 4.5  3.6 - 5.1 g/dL Final    Globulin, Total 07/26/2022 2.9  1.9 - 3.7 g/dL (calc) Final    Albumin/Globulin Ratio 07/26/2022 1.6  1.0 - 2.5 (calc) Final    Total Bilirubin 07/26/2022 0.3  0.2 - 1.2 mg/dL Final     Alkaline Phosphatase 07/26/2022 46  31 - 125 U/L Final    AST 07/26/2022 18  10 - 30 U/L Final    ALT 07/26/2022 12  6 - 29 U/L Final    Cholesterol 07/26/2022 240 (A) <200 mg/dL Final    HDL 07/26/2022 75  > OR = 50 mg/dL Final    Triglycerides 07/26/2022 183 (A) <150 mg/dL Final    LDL Cholesterol 07/26/2022 133 (A) mg/dL (calc) Final    Comment: Reference range: <100     Desirable range <100 mg/dL for primary prevention;    <70 mg/dL for patients with CHD or diabetic patients   with > or = 2 CHD risk factors.     LDL-C is now calculated using the Morteza   calculation, which is a validated novel method providing   better accuracy than the Friedewald equation in the   estimation of LDL-C.   Jim SOMMERS et al. JOSE. 2013;310(19): 4830-9480   (http://education.[employer].Sailthru/faq/ROD983)      HDL/Cholesterol Ratio 07/26/2022 3.2  <5.0 (calc) Final    Non HDL Chol. (LDL+VLDL) 07/26/2022 165 (A) <130 mg/dL (calc) Final    Comment: For patients with diabetes plus 1 major ASCVD risk   factor, treating to a non-HDL-C goal of <100 mg/dL   (LDL-C of <70 mg/dL) is considered a therapeutic   option.      Color, UA 07/26/2022 YELLOW  YELLOW Final    Appearance, UA 07/26/2022 CLEAR  CLEAR Final    Specific [place], UA 07/26/2022 1.024  1.001 - 1.035 Final    pH, UA 07/26/2022 6.5  5.0 - 8.0 Final    Glucose, UA 07/26/2022 NEGATIVE  NEGATIVE Final    Bilirubin, UA 07/26/2022 NEGATIVE  NEGATIVE Final    Ketones, UA 07/26/2022 NEGATIVE  NEGATIVE Final    Occult Blood UA 07/26/2022 NEGATIVE  NEGATIVE Final    Protein, UA 07/26/2022 NEGATIVE  NEGATIVE Final    Nitrite, UA 07/26/2022 NEGATIVE  NEGATIVE Final    Leukocytes, UA 07/26/2022 NEGATIVE  NEGATIVE Final    WBC Casts, UA 07/26/2022 NONE SEEN  < OR = 5 /HPF Final    RBC Casts, UA 07/26/2022 0-2  < OR = 2 /HPF Final    Squam Epithel, UA 07/26/2022 NONE SEEN  < OR = 5 /HPF Final    Bacteria, UA 07/26/2022 NONE SEEN  NONE SEEN /HPF Final     Hyaline Casts, UA 07/26/2022 NONE SEEN  NONE SEEN /LPF Final    Service Cmt: 07/26/2022    Final    Comment: This urine was analyzed for the presence of WBC,   RBC, bacteria, casts, and other formed elements.   Only those elements seen were reported.            Reflexive Urine Culture 07/26/2022    Final    NO CULTURE INDICATED    TSH w/reflex to FT4 07/26/2022 1.04  mIU/L Final    Comment:           Reference Range                         > or = 20 Years  0.40-4.50                              Pregnancy Ranges            First trimester    0.26-2.66            Second trimester   0.55-2.73            Third trimester    0.43-2.91      WBC 07/26/2022 8.0  3.8 - 10.8 Thousand/uL Final    RBC 07/26/2022 4.46  3.80 - 5.10 Million/uL Final    Hemoglobin 07/26/2022 13.6  11.7 - 15.5 g/dL Final    Hematocrit 07/26/2022 39.9  35.0 - 45.0 % Final    MCV 07/26/2022 89.5  80.0 - 100.0 fL Final    MCH 07/26/2022 30.5  27.0 - 33.0 pg Final    MCHC 07/26/2022 34.1  32.0 - 36.0 g/dL Final    RDW 07/26/2022 12.6  11.0 - 15.0 % Final    Platelets 07/26/2022 353  140 - 400 Thousand/uL Final    MPV 07/26/2022 10.4  7.5 - 12.5 fL Final    Neutrophils, Abs 07/26/2022 5,360  1,500 - 7,800 cells/uL Final    Lymph # 07/26/2022 1,936  850 - 3,900 cells/uL Final    Mono # 07/26/2022 616  200 - 950 cells/uL Final    Eos # 07/26/2022 40  15 - 500 cells/uL Final    Baso # 07/26/2022 48  0 - 200 cells/uL Final    Neutrophils Relative 07/26/2022 67  % Final    Lymph % 07/26/2022 24.2  % Final    Mono % 07/26/2022 7.7  % Final    Eosinophil % 07/26/2022 0.5  % Final    Basophil % 07/26/2022 0.6  % Final         Past Medical History:   Diagnosis Date    Hypertension     Kidney stone     Migraine      Social History     Socioeconomic History    Marital status:    Tobacco Use    Smoking status: Never Smoker    Smokeless tobacco: Never Used   Substance and Sexual Activity    Alcohol use: Yes     Comment: rarely     Drug use: Never     Past Surgical History:   Procedure Laterality Date    CYST REMOVAL Right 2005    KIDNEY STONE SURGERY       Family History   Problem Relation Age of Onset    Hypertension Mother     Obesity Mother     Diabetes Father     Hypertension Father     Hyperlipidemia Father     Stroke Father     Lung cancer Maternal Grandfather        Review of patient's allergies indicates:   Allergen Reactions    Penicillins Hives, Shortness Of Breath and Swelling    Yeast        Current Outpatient Medications:     biotin 1,000 mcg Chew, Take by mouth., Disp: , Rfl:     drospirenone-e.estradioL-lm.FA (BEYAZ/LYDIA) 3-0.02-0.451 mg (24) (4) Tab, , Disp: , Rfl:     NP THYROID 30 mg Tab, Take 30 mg by mouth every morning., Disp: , Rfl:     omeprazole (PRILOSEC) 20 MG capsule, Take 20 mg by mouth once daily., Disp: , Rfl:     ondansetron (ZOFRAN-ODT) 4 MG TbDL, Take 1 tablet (4 mg total) by mouth every 8 (eight) hours as needed., Disp: 10 tablet, Rfl: 0    spironolactone (ALDACTONE) 50 MG tablet, Take 50 mg by mouth 2 (two) times daily., Disp: , Rfl:     sumatriptan (IMITREX) 50 MG tablet, Take 1 tablet (50 mg total) by mouth daily as needed for Migraine. May repeat in 2 hours if needed. Max 100mg/24 hrs, Disp: 9 tablet, Rfl: 5    Review of Systems   Constitutional: Negative for appetite change, fatigue, fever and unexpected weight change.   Respiratory: Negative for cough, chest tightness, shortness of breath and wheezing.    Cardiovascular: Negative for chest pain and leg swelling.   Gastrointestinal: Negative for abdominal pain, constipation, nausea and vomiting.        -heartburn   Genitourinary: Negative for difficulty urinating, dysuria, frequency and urgency.   Musculoskeletal: Negative for arthralgias, back pain, myalgias and neck pain.   Skin: Negative for rash.   Neurological: Negative for dizziness, weakness, numbness and headaches.   Hematological: Does not bruise/bleed easily.  "  Psychiatric/Behavioral: Negative for dysphoric mood, sleep disturbance and suicidal ideas. The patient is not nervous/anxious.    All other systems reviewed and are negative.         Objective:      Vitals:    08/01/22 0704   BP: 124/74   Weight: 73 kg (161 lb)   Height: 5' 3" (1.6 m)     Physical Exam  Vitals reviewed.   Constitutional:       General: She is not in acute distress.     Appearance: She is well-developed.   HENT:      Head: Normocephalic and atraumatic.   Neck:      Thyroid: No thyromegaly.   Cardiovascular:      Rate and Rhythm: Normal rate and regular rhythm.      Heart sounds: Normal heart sounds. No murmur heard.    No friction rub.   Pulmonary:      Effort: Pulmonary effort is normal.      Breath sounds: Normal breath sounds. No wheezing or rales.   Abdominal:      General: Bowel sounds are normal. There is no distension.      Palpations: Abdomen is soft.      Tenderness: There is no abdominal tenderness.   Musculoskeletal:      Cervical back: Neck supple.   Lymphadenopathy:      Cervical: No cervical adenopathy.   Skin:     General: Skin is warm and dry.      Findings: No rash.   Neurological:      Mental Status: She is alert and oriented to person, place, and time.   Psychiatric:         Attention and Perception: She is attentive.         Speech: Speech normal.         Behavior: Behavior normal.         Thought Content: Thought content normal.         Judgment: Judgment normal.           Assessment:       1. Seasonal allergic rhinitis due to pollen    2. Chronic migraine without aura without status migrainosus, not intractable    3. Chronic pain of both hips    4. Nephrolithiasis    5. Gastroesophageal reflux disease without esophagitis         Plan:       Seasonal allergic rhinitis due to pollen  Comments:  Controlled. Will continue to monitor symptoms    Chronic migraine without aura without status migrainosus, not intractable  Comments:  Controlled. Will continue to monitor severity and " frequency of headaches.  Orders:  -     sumatriptan (IMITREX) 50 MG tablet; Take 1 tablet (50 mg total) by mouth daily as needed for Migraine. May repeat in 2 hours if needed. Max 100mg/24 hrs  Dispense: 9 tablet; Refill: 5    Chronic pain of both hips  Comments:  Chronic. To continue conservative care with stretches.     Nephrolithiasis  Comments:  Chronic. Will continue to monitor symptoms and f/u with Urology    Gastroesophageal reflux disease without esophagitis  Comments:  Controlled. Will continue to monitor symptoms on ppi.    Other  Lab results discussed and reviewed with patient.    Follow up in about 6 months (around 2/1/2023) for Allergies, Migraines, Arthritis, GERD.        8/1/2022 Ashu Hoyt

## 2023-03-03 ENCOUNTER — OFFICE VISIT (OUTPATIENT)
Dept: FAMILY MEDICINE | Facility: CLINIC | Age: 38
End: 2023-03-03
Payer: COMMERCIAL

## 2023-03-03 VITALS
DIASTOLIC BLOOD PRESSURE: 70 MMHG | HEART RATE: 77 BPM | SYSTOLIC BLOOD PRESSURE: 120 MMHG | WEIGHT: 159 LBS | BODY MASS INDEX: 28.17 KG/M2 | OXYGEN SATURATION: 99 % | HEIGHT: 63 IN

## 2023-03-03 DIAGNOSIS — L30.9 DERMATITIS: ICD-10-CM

## 2023-03-03 DIAGNOSIS — Z00.00 ANNUAL PHYSICAL EXAM: Primary | ICD-10-CM

## 2023-03-03 DIAGNOSIS — K21.9 GASTROESOPHAGEAL REFLUX DISEASE WITHOUT ESOPHAGITIS: ICD-10-CM

## 2023-03-03 DIAGNOSIS — Z79.899 LONG-TERM USE OF HIGH-RISK MEDICATION: ICD-10-CM

## 2023-03-03 DIAGNOSIS — J30.1 SEASONAL ALLERGIC RHINITIS DUE TO POLLEN: ICD-10-CM

## 2023-03-03 DIAGNOSIS — G43.709 CHRONIC MIGRAINE WITHOUT AURA WITHOUT STATUS MIGRAINOSUS, NOT INTRACTABLE: ICD-10-CM

## 2023-03-03 PROCEDURE — 1160F RVW MEDS BY RX/DR IN RCRD: CPT | Mod: CPTII,S$GLB,, | Performed by: FAMILY MEDICINE

## 2023-03-03 PROCEDURE — 99395 PR PREVENTIVE VISIT,EST,18-39: ICD-10-PCS | Mod: S$GLB,,, | Performed by: FAMILY MEDICINE

## 2023-03-03 PROCEDURE — 3008F BODY MASS INDEX DOCD: CPT | Mod: CPTII,S$GLB,, | Performed by: FAMILY MEDICINE

## 2023-03-03 PROCEDURE — 1159F PR MEDICATION LIST DOCUMENTED IN MEDICAL RECORD: ICD-10-PCS | Mod: CPTII,S$GLB,, | Performed by: FAMILY MEDICINE

## 2023-03-03 PROCEDURE — 1160F PR REVIEW ALL MEDS BY PRESCRIBER/CLIN PHARMACIST DOCUMENTED: ICD-10-PCS | Mod: CPTII,S$GLB,, | Performed by: FAMILY MEDICINE

## 2023-03-03 PROCEDURE — 3078F PR MOST RECENT DIASTOLIC BLOOD PRESSURE < 80 MM HG: ICD-10-PCS | Mod: CPTII,S$GLB,, | Performed by: FAMILY MEDICINE

## 2023-03-03 PROCEDURE — 99395 PREV VISIT EST AGE 18-39: CPT | Mod: S$GLB,,, | Performed by: FAMILY MEDICINE

## 2023-03-03 PROCEDURE — 1159F MED LIST DOCD IN RCRD: CPT | Mod: CPTII,S$GLB,, | Performed by: FAMILY MEDICINE

## 2023-03-03 PROCEDURE — 3078F DIAST BP <80 MM HG: CPT | Mod: CPTII,S$GLB,, | Performed by: FAMILY MEDICINE

## 2023-03-03 PROCEDURE — 3074F PR MOST RECENT SYSTOLIC BLOOD PRESSURE < 130 MM HG: ICD-10-PCS | Mod: CPTII,S$GLB,, | Performed by: FAMILY MEDICINE

## 2023-03-03 PROCEDURE — 3074F SYST BP LT 130 MM HG: CPT | Mod: CPTII,S$GLB,, | Performed by: FAMILY MEDICINE

## 2023-03-03 PROCEDURE — 3008F PR BODY MASS INDEX (BMI) DOCUMENTED: ICD-10-PCS | Mod: CPTII,S$GLB,, | Performed by: FAMILY MEDICINE

## 2023-03-03 RX ORDER — SUMATRIPTAN 50 MG/1
50 TABLET, FILM COATED ORAL DAILY PRN
Qty: 9 TABLET | Refills: 5 | Status: SHIPPED | OUTPATIENT
Start: 2023-03-03 | End: 2023-10-06 | Stop reason: SDUPTHER

## 2023-03-03 NOTE — PROGRESS NOTES
SUBJECTIVE:    Patient ID: Meri Krishna is a 37 y.o. female.    Chief Complaint: Follow-up (Meds verbally confirmed//dp)    Pt here to checkup on acute and chronic conditions (HTN, GERD, and Migraine)    BP is doing ok today. Denies CP/SOB.      No recent kidney stones. ([place])  needs to get some testing done, had to reschedule due to financing.     Has been having regular headaches daily. Will get headaches if she doesn't hydrate enough. Not having bad migraines are improved. Still has minor issues with recalling words since COVID.  Takes imitrex as needed. Insurance would not pay for ubrelvy.    Wakes up a lot of night. Going to the bathroom 3-4 times a day, but also cant go back to sleep if she has something to do on  her mind.     Denies heartburn.  Has to take prilosec daily.  Cannot miss. Was taking at night but would get it before her next dose, so started taking it in the morning and symptoms are better. Unsure why as she watches her diet.  Wakes up coughing sometimes, with heartburn,  unsure why. Has never been to GI. Several family members have bad reflux.     Allergies are doing ok this week.     Since having COVID, has been having a rash on the left side of the chin and the nasal creases, that is bumpy then scales over, and then keep repeating that process. Has an upcoming appt with derm as she has failed steroid creams that only helps with the itchiness.    Continue to have issues w/ lower back and hips daily(L>R). Tries to stretch daily, started trying to do yoga, which helps. Worse depending on what she does.  Uses heating pad or tiger balm on the lower back when needed. Dx snapping hip ([place]).  Possibly some SI joint dysfunction.  Last PT was over a year.  Does spin class a few times a week for exercise.      Went to podiatrist, told cyst on her left foot is a ganglion cyst.  It is in a bad spot. Has chosen not to do anything about it.     No recent  labs.  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   Has been doing biote' ([place]) since 6/2021 due to low testosterone.       Office Visit on 06/29/2022   Component Date Value Ref Range Status    Color, UA 06/29/2022 Yellow   Final    pH, UA 06/29/2022 6.5   Final    WBC, UA 06/29/2022 neg   Final    Nitrite, UA 06/29/2022 neg   Final    Protein, POC 06/29/2022 neg   Final    Glucose, UA 06/29/2022 neg   Final    Ketones, UA 06/29/2022 neg   Final    Urobilinogen, UA 06/29/2022 0.2   Final    Bilirubin, POC 06/29/2022 neg   Final    Blood, UA 06/29/2022 neg   Final    Clarity, UA 06/29/2022 Clear   Final    Spec Grav UA 06/29/2022 1.020   Final   Admission on 06/22/2022, Discharged on 06/22/2022   Component Date Value Ref Range Status    POC Preg Test, Ur 06/22/2022 Negative  Negative Final     Acceptable 06/22/2022 Yes   Final    Specimen UA 06/22/2022 Urine, Clean Catch   Final    Color, UA 06/22/2022 Colorless (A)  Yellow, Straw, [place] Final    Appearance, UA 06/22/2022 Clear  Clear Final    pH, UA 06/22/2022 7.0  5.0 - 8.0 Final    Specific Gravity, UA 06/22/2022 1.010  1.005 - 1.030 Final    Protein, UA 06/22/2022 Negative  Negative Final    Comment: Recommend a 24 hour urine protein or a urine   protein/creatinine ratio if globulin induced proteinuria is  clinically suspected.      Glucose, UA 06/22/2022 Negative  Negative Final    Ketones, UA 06/22/2022 Negative  Negative Final    Bilirubin (UA) 06/22/2022 Negative  Negative Final    Occult Blood UA 06/22/2022 Negative  Negative Final    Nitrite, UA 06/22/2022 Negative  Negative Final    Urobilinogen, UA 06/22/2022 Negative  Negative EU/dL Final    Leukocytes, UA 06/22/2022 Negative  Negative Final    WBC 06/22/2022 10.14  3.90 - 12.70 K/uL Final    RBC 06/22/2022 4.38  4.00 - 5.40 M/uL Final    Hemoglobin 06/22/2022 13.2  12.0 - 16.0 g/dL Final    Hematocrit 06/22/2022 38.0  37.0 -  48.5 % Final    MCV 06/22/2022 87  82 - 98 fL Final    MCH 06/22/2022 30.1  27.0 - 31.0 pg Final    MCHC 06/22/2022 34.7  32.0 - 36.0 g/dL Final    RDW 06/22/2022 12.8  11.5 - 14.5 % Final    Platelets 06/22/2022 355  150 - 450 K/uL Final    MPV 06/22/2022 9.9  9.2 - 12.9 fL Final    Immature Granulocytes 06/22/2022 0.2  0.0 - 0.5 % Final    Gran # (ANC) 06/22/2022 5.4  1.8 - 7.7 K/uL Final    Immature Grans (Abs) 06/22/2022 0.02  0.00 - 0.04 K/uL Final    Comment: Mild elevation in immature granulocytes is non specific and   can be seen in a variety of conditions including stress response,   acute inflammation, trauma and pregnancy. Correlation with other   laboratory and clinical findings is essential.      Lymph # 06/22/2022 3.8  1.0 - 4.8 K/uL Final    Mono # 06/22/2022 0.8  0.3 - 1.0 K/uL Final    Eos # 06/22/2022 0.1  0.0 - 0.5 K/uL Final    Baso # 06/22/2022 0.06  0.00 - 0.20 K/uL Final    nRBC 06/22/2022 0  0 /100 WBC Final    Gran % 06/22/2022 53.1  38.0 - 73.0 % Final    Lymph % 06/22/2022 37.2  18.0 - 48.0 % Final    Mono % 06/22/2022 8.1  4.0 - 15.0 % Final    Eosinophil % 06/22/2022 0.8  0.0 - 8.0 % Final    Basophil % 06/22/2022 0.6  0.0 - 1.9 % Final    Differential Method 06/22/2022 Automated   Final    Sodium 06/22/2022 136  136 - 145 mmol/L Final    Potassium 06/22/2022 3.4 (L)  3.5 - 5.1 mmol/L Final    Chloride 06/22/2022 103  95 - 110 mmol/L Final    CO2 06/22/2022 22 (L)  23 - 29 mmol/L Final    Glucose 06/22/2022 95  70 - 110 mg/dL Final    BUN 06/22/2022 17  6 - 20 mg/dL Final    Creatinine 06/22/2022 0.9  0.5 - 1.4 mg/dL Final    Calcium 06/22/2022 9.6  8.7 - 10.5 mg/dL Final    Total Protein 06/22/2022 7.5  6.0 - 8.4 g/dL Final    Albumin 06/22/2022 4.3  3.5 - 5.2 g/dL Final    Total Bilirubin 06/22/2022 0.5  0.1 - 1.0 mg/dL Final    Comment: For infants and newborns, interpretation of results should be based  on gestational age, weight and in agreement with  clinical  observations.    Premature Infant recommended reference ranges:  Up to 24 hours.............<8.0 mg/dL  Up to 48 hours............<12.0 mg/dL  3-5 days..................<15.0 mg/dL  6-29 days.................<15.0 mg/dL      Alkaline Phosphatase 06/22/2022 46 (L)  55 - 135 U/L Final    AST 06/22/2022 22  10 - 40 U/L Final    ALT 06/22/2022 21  10 - 44 U/L Final    Anion Gap 06/22/2022 11  8 - 16 mmol/L Final    eGFR if African American 06/22/2022 >60.0  >60 mL/min/1.73 m^2 Final    eGFR if non African American 06/22/2022 >60.0  >60 mL/min/1.73 m^2 Final    Comment: Calculation used to obtain the estimated glomerular filtration  rate (eGFR) is the CKD-EPI equation.       Lipase 06/22/2022 30  4 - 60 U/L Final         Past Medical History:   Diagnosis Date    Hypertension     Kidney stone     Migraine      Social History     Socioeconomic History    Marital status:    Tobacco Use    Smoking status: Never    Smokeless tobacco: Never   Substance and Sexual Activity    Alcohol use: Yes     Comment: rarely    Drug use: Never     Social Determinants of Health     Financial Resource Strain: Medium Risk    Difficulty of Paying Living Expenses: Somewhat hard   Food Insecurity: No Food Insecurity    Worried About Running Out of Food in the Last Year: Never true    Ran Out of Food in the Last Year: Never true   Transportation Needs: No Transportation Needs    Lack of Transportation (Medical): No    Lack of Transportation (Non-Medical): No   Physical Activity: Sufficiently Active    Days of Exercise per Week: 5 days    Minutes of Exercise per Session: 50 min   Stress: Stress Concern Present    Feeling of Stress : To some extent   Social Connections: Unknown    Frequency of Communication with Friends and Family: More than three times a week    Frequency of Social Gatherings with Friends and Family: Once a week    Active Member of Clubs or Organizations: Yes    Attends Club or Organization Meetings: 1 to 4 times  per year    Marital Status:    Housing Stability: Low Risk     Unable to Pay for Housing in the Last Year: No    Number of Places Lived in the Last Year: 1    Unstable Housing in the Last Year: No     Past Surgical History:   Procedure Laterality Date    CYST REMOVAL Right 2005    KIDNEY STONE SURGERY       Family History   Problem Relation Age of Onset    Hypertension Mother     Obesity Mother     Diabetes Father     Hypertension Father     Hyperlipidemia Father     Stroke Father     Lung cancer Maternal Grandfather        Review of patient's allergies indicates:   Allergen Reactions    Penicillins Hives, Shortness Of Breath and Swelling    Yeast        Current Outpatient Medications:     biotin 1,000 mcg Chew, Take by mouth., Disp: , Rfl:     drospirenone-e.estradioL-lm.FA (BEYAZ/LYDIA) 3-0.02-0.451 mg (24) (4) Tab, , Disp: , Rfl:     omeprazole (PRILOSEC) 20 MG capsule, Take 20 mg by mouth once daily., Disp: , Rfl:     ondansetron (ZOFRAN-ODT) 4 MG TbDL, Take 1 tablet (4 mg total) by mouth every 8 (eight) hours as needed., Disp: 10 tablet, Rfl: 0    spironolactone (ALDACTONE) 50 MG tablet, Take 50 mg by mouth 2 (two) times daily., Disp: , Rfl:     sumatriptan (IMITREX) 50 MG tablet, Take 1 tablet (50 mg total) by mouth daily as needed for Migraine. May repeat in 2 hours if needed. Max 100mg/24 hrs, Disp: 9 tablet, Rfl: 5    Review of Systems   Constitutional:  Negative for appetite change, fatigue, fever and unexpected weight change.   Respiratory:  Negative for cough, chest tightness, shortness of breath and wheezing.    Cardiovascular:  Negative for chest pain and leg swelling.   Gastrointestinal:  Negative for abdominal pain, constipation, nausea and vomiting.        +heartburn   Genitourinary:  Negative for difficulty urinating, dysuria, frequency and urgency.   Musculoskeletal:  Negative for arthralgias, back pain, myalgias and neck pain.   Skin:  Positive for rash.   Neurological:  Positive for  "headaches. Negative for dizziness, weakness and numbness.   Hematological:  Does not bruise/bleed easily.   Psychiatric/Behavioral:  Negative for dysphoric mood, sleep disturbance and suicidal ideas. The patient is not nervous/anxious.    All other systems reviewed and are negative.       Objective:      Vitals:    03/03/23 0941   BP: 120/70   Pulse: 77   SpO2: 99%   Weight: 72.1 kg (159 lb)   Height: 5' 3" (1.6 m)     Physical Exam  Vitals reviewed.   Constitutional:       General: She is not in acute distress.     Appearance: She is well-developed.   HENT:      Head: Normocephalic and atraumatic.   Neck:      Thyroid: No thyromegaly.   Cardiovascular:      Rate and Rhythm: Normal rate and regular rhythm.      Heart sounds: Normal heart sounds. No murmur heard.    No friction rub.   Pulmonary:      Effort: Pulmonary effort is normal.      Breath sounds: Normal breath sounds. No wheezing or rales.   Abdominal:      General: Bowel sounds are normal. There is no distension.      Palpations: Abdomen is soft.      Tenderness: There is no abdominal tenderness.   Musculoskeletal:      Cervical back: Neck supple.   Lymphadenopathy:      Cervical: No cervical adenopathy.   Skin:     General: Skin is warm and dry.      Findings: No rash.   Neurological:      Mental Status: She is alert and oriented to person, place, and time.   Psychiatric:         Attention and Perception: She is attentive.         Speech: Speech normal.         Behavior: Behavior normal.         Thought Content: Thought content normal.         Judgment: Judgment normal.         Assessment:       1. Annual physical exam    2. Chronic migraine without aura without status migrainosus, not intractable    3. Seasonal allergic rhinitis due to pollen    4. Gastroesophageal reflux disease without esophagitis    5. Dermatitis    6. Long-term use of high-risk medication         Plan:       Annual physical exam  -     Comprehensive Metabolic Panel; Future; Expected " date: 03/03/2023  -     Lipid Panel; Future; Expected date: 03/03/2023  -     TSH w/reflex to FT4; Future; Expected date: 03/03/2023  -     Urinalysis, Reflex to Urine Culture Urine, Clean Catch; Future; Expected date: 03/03/2023  -     CBC Auto Differential; Future; Expected date: 03/03/2023    Chronic migraine without aura without status migrainosus, not intractable  Comments:  Controlled. Will continue to monitor severity and frequency of headaches.  Orders:  -     sumatriptan (IMITREX) 50 MG tablet; Take 1 tablet (50 mg total) by mouth daily as needed for Migraine. May repeat in 2 hours if needed. Max 100mg/24 hrs  Dispense: 9 tablet; Refill: 5    Seasonal allergic rhinitis due to pollen  Comments:  Chronic. To continue antihistamines, flonase as needed.    Gastroesophageal reflux disease without esophagitis  Comments:  Symptomatic. To continue ppi, will refer to GI for eval.  Orders:  -     Ambulatory referral/consult to Gastroenterology; Future; Expected date: 03/10/2023    Dermatitis  Comments:  Chronic. To follow up with Derm soon    Long-term use of high-risk medication  -     sumatriptan (IMITREX) 50 MG tablet; Take 1 tablet (50 mg total) by mouth daily as needed for Migraine. May repeat in 2 hours if needed. Max 100mg/24 hrs  Dispense: 9 tablet; Refill: 5  -     Comprehensive Metabolic Panel; Future; Expected date: 03/03/2023  -     Lipid Panel; Future; Expected date: 03/03/2023  -     TSH w/reflex to FT4; Future; Expected date: 03/03/2023  -     Urinalysis, Reflex to Urine Culture Urine, Clean Catch; Future; Expected date: 03/03/2023  -     CBC Auto Differential; Future; Expected date: 03/03/2023    Labs and/or tests have been ordered for the evaluation/monitoring of acute/chronic conditions, to be done just before next visit.    Follow up in about 6 months (around 9/3/2023) for Migraines, Allergies, GERD, LABS.        3/3/2023 Ashu Hoyt

## 2023-03-29 ENCOUNTER — OFFICE VISIT (OUTPATIENT)
Dept: DERMATOLOGY | Facility: CLINIC | Age: 38
End: 2023-03-29
Payer: COMMERCIAL

## 2023-03-29 DIAGNOSIS — L81.9 DYSPIGMENTATION: ICD-10-CM

## 2023-03-29 DIAGNOSIS — R21 RASH: ICD-10-CM

## 2023-03-29 DIAGNOSIS — L21.9 SEBORRHEIC DERMATITIS: Primary | ICD-10-CM

## 2023-03-29 DIAGNOSIS — L81.4 LENTIGO: ICD-10-CM

## 2023-03-29 PROCEDURE — 99204 PR OFFICE/OUTPT VISIT, NEW, LEVL IV, 45-59 MIN: ICD-10-PCS | Mod: S$GLB,,, | Performed by: STUDENT IN AN ORGANIZED HEALTH CARE EDUCATION/TRAINING PROGRAM

## 2023-03-29 PROCEDURE — 99999 PR PBB SHADOW E&M-EST. PATIENT-LVL III: ICD-10-PCS | Mod: PBBFAC,,, | Performed by: STUDENT IN AN ORGANIZED HEALTH CARE EDUCATION/TRAINING PROGRAM

## 2023-03-29 PROCEDURE — 1160F RVW MEDS BY RX/DR IN RCRD: CPT | Mod: CPTII,S$GLB,, | Performed by: STUDENT IN AN ORGANIZED HEALTH CARE EDUCATION/TRAINING PROGRAM

## 2023-03-29 PROCEDURE — 1160F PR REVIEW ALL MEDS BY PRESCRIBER/CLIN PHARMACIST DOCUMENTED: ICD-10-PCS | Mod: CPTII,S$GLB,, | Performed by: STUDENT IN AN ORGANIZED HEALTH CARE EDUCATION/TRAINING PROGRAM

## 2023-03-29 PROCEDURE — 1159F PR MEDICATION LIST DOCUMENTED IN MEDICAL RECORD: ICD-10-PCS | Mod: CPTII,S$GLB,, | Performed by: STUDENT IN AN ORGANIZED HEALTH CARE EDUCATION/TRAINING PROGRAM

## 2023-03-29 PROCEDURE — 99999 PR PBB SHADOW E&M-EST. PATIENT-LVL III: CPT | Mod: PBBFAC,,, | Performed by: STUDENT IN AN ORGANIZED HEALTH CARE EDUCATION/TRAINING PROGRAM

## 2023-03-29 PROCEDURE — 99204 OFFICE O/P NEW MOD 45 MIN: CPT | Mod: S$GLB,,, | Performed by: STUDENT IN AN ORGANIZED HEALTH CARE EDUCATION/TRAINING PROGRAM

## 2023-03-29 PROCEDURE — 1159F MED LIST DOCD IN RCRD: CPT | Mod: CPTII,S$GLB,, | Performed by: STUDENT IN AN ORGANIZED HEALTH CARE EDUCATION/TRAINING PROGRAM

## 2023-03-29 RX ORDER — FLUOCINONIDE TOPICAL SOLUTION USP, 0.05% 0.5 MG/ML
SOLUTION TOPICAL 2 TIMES DAILY
Qty: 60 ML | Refills: 2 | Status: SHIPPED | OUTPATIENT
Start: 2023-03-29

## 2023-03-29 RX ORDER — TRIAMCINOLONE ACETONIDE 0.25 MG/G
CREAM TOPICAL 2 TIMES DAILY
Qty: 80 G | Refills: 1 | Status: SHIPPED | OUTPATIENT
Start: 2023-03-29

## 2023-03-29 RX ORDER — KETOCONAZOLE 20 MG/G
CREAM TOPICAL 2 TIMES DAILY
Qty: 60 G | Refills: 2 | Status: SHIPPED | OUTPATIENT
Start: 2023-03-29

## 2023-03-29 NOTE — PROGRESS NOTES
Subjective:      Patient ID:  Meri Krishna is a 37 y.o. female who presents for   Chief Complaint   Patient presents with    Dry Skin     Chin, around nose     New patient     Patient here today for rash/dry skin on left chin, hairline and around nose. States it started in 2021. Very dry and flaky. Around her nose feels irritated. Chin itches. On hormone therapy, started in 2021. Had Covid in 2021. Moisturizes daily.     Currently prescribed hormone pellets including testosterone by obgyn. She takes spironolactone 100mg daily to prevent hair loss.   Also states she was getting an itchy rash on right thigh everytime she had menses so her obgyn started her on beyaz so that she will not have menses.         Review of Systems   Constitutional:  Negative for fever, chills and fatigue.   Respiratory:  Negative for cough and shortness of breath.    Gastrointestinal:  Negative for nausea and vomiting.   Skin:  Positive for itching, rash and dry skin.     Objective:   Physical Exam   Constitutional: She appears well-developed and well-nourished.   Neurological: She is alert and oriented to person, place, and time.   Psychiatric: She has a normal mood and affect.   Skin:   Areas Examined (abnormalities noted in diagram):   Scalp / Hair Palpated and Inspected  Head / Face Inspection Performed          Diagram Legend     Erythematous scaling macule/papule c/w actinic keratosis       Vascular papule c/w angioma      Pigmented verrucoid papule/plaque c/w seborrheic keratosis      Yellow umbilicated papule c/w sebaceous hyperplasia      Irregularly shaped tan macule c/w lentigo     1-2 mm smooth white papules consistent with Milia      Movable subcutaneous cyst with punctum c/w epidermal inclusion cyst      Subcutaneous movable cyst c/w pilar cyst      Firm pink to brown papule c/w dermatofibroma      Pedunculated fleshy papule(s) c/w skin tag(s)      Evenly pigmented macule c/w junctional nevus     Mildly variegated  pigmented, slightly irregular-bordered macule c/w mildly atypical nevus      Flesh colored to evenly pigmented papule c/w intradermal nevus       Pink pearly papule/plaque c/w basal cell carcinoma      Erythematous hyperkeratotic cursted plaque c/w SCC      Surgical scar with no sign of skin cancer recurrence      Open and closed comedones      Inflammatory papules and pustules      Verrucoid papule consistent consistent with wart     Erythematous eczematous patches and plaques     Dystrophic onycholytic nail with subungual debris c/w onychomycosis     Umbilicated papule    Erythematous-base heme-crusted tan verrucoid plaque consistent with inflamed seborrheic keratosis     Erythematous Silvery Scaling Plaque c/w Psoriasis     See annotation        Assessment / Plan:        Seborrheic dermatitis  -     ketoconazole (NIZORAL) 2 % cream; Apply topically 2 (two) times daily.  Dispense: 60 g; Refill: 2  -     triamcinolone acetonide 0.025% (KENALOG) 0.025 % cream; Apply topically 2 (two) times daily.  Dispense: 80 g; Refill: 1  -     fluocinonide (LIDEX) 0.05 % external solution; Apply topically 2 (two) times daily.  Dispense: 60 mL; Refill: 2  - face: mix triamcinolone 0.025% cream (only use when flaring) with ketoconazole cream (ok for maintenance) and apply twice daily x 2 weeks  - zinc pyrithione bar soap    -scalp: fluocinonide solution twice daily x 2 weeks.     Dyspigmentation  - left chin  - left cheek  - likely related to above eruption  - strict sun protection with mineral sunscreen daily, SPF 30 or greater    Lentigo  This is a benign hyperpigmented sun induced lesion. Daily sun protection will reduce the number of new lesions. Treatment of these benign lesions are considered cosmetic.    Rash  - on right thigh when menses occurs, takes NSAIDs intermittently w/ menses, rash has not been present for awhile since she start OCP, no rash today per patient. RTC if rash recurs for further eval         No follow-ups  on file.

## 2023-03-29 NOTE — PATIENT INSTRUCTIONS
- mix triamcinolone 0.025% cream (only use when flaring) with ketoconazole cream (ok for maintenance) and apply twice daily x 2 weeks  - zinc pyrithione bar soap    - fluocinonide solution twice daily x 2 weeks.

## 2023-04-11 ENCOUNTER — PATIENT MESSAGE (OUTPATIENT)
Dept: ADMINISTRATIVE | Facility: HOSPITAL | Age: 38
End: 2023-04-11
Payer: COMMERCIAL

## 2023-04-11 ENCOUNTER — PATIENT MESSAGE (OUTPATIENT)
Dept: RESEARCH | Facility: HOSPITAL | Age: 38
End: 2023-04-11
Payer: COMMERCIAL

## 2023-04-14 DIAGNOSIS — Z12.31 OTHER SCREENING MAMMOGRAM: ICD-10-CM

## 2023-04-17 ENCOUNTER — PATIENT MESSAGE (OUTPATIENT)
Dept: ADMINISTRATIVE | Facility: HOSPITAL | Age: 38
End: 2023-04-17
Payer: COMMERCIAL

## 2023-04-26 ENCOUNTER — HOSPITAL ENCOUNTER (OUTPATIENT)
Dept: RADIOLOGY | Facility: CLINIC | Age: 38
Discharge: HOME | End: 2023-04-26
Attending: FAMILY MEDICINE
Payer: COMMERCIAL

## 2023-04-26 DIAGNOSIS — Z12.31 OTHER SCREENING MAMMOGRAM: ICD-10-CM

## 2023-04-26 PROCEDURE — 77067 SCR MAMMO BI INCL CAD: CPT | Mod: TC,PO

## 2023-04-26 PROCEDURE — 77067 SCR MAMMO BI INCL CAD: CPT | Mod: 26,,, | Performed by: RADIOLOGY

## 2023-04-26 PROCEDURE — 77063 BREAST TOMOSYNTHESIS BI: CPT | Mod: 26,,, | Performed by: RADIOLOGY

## 2023-04-26 PROCEDURE — 77063 MAMMO DIGITAL SCREENING BILAT WITH TOMO: ICD-10-PCS | Mod: 26,,, | Performed by: RADIOLOGY

## 2023-04-26 PROCEDURE — 77067 MAMMO DIGITAL SCREENING BILAT WITH TOMO: ICD-10-PCS | Mod: 26,,, | Performed by: RADIOLOGY

## 2023-09-13 ENCOUNTER — OFFICE VISIT (OUTPATIENT)
Dept: DERMATOLOGY | Facility: CLINIC | Age: 38
End: 2023-09-13
Payer: COMMERCIAL

## 2023-09-13 DIAGNOSIS — L21.9 SEBORRHEIC DERMATITIS: Primary | ICD-10-CM

## 2023-09-13 PROCEDURE — 99214 OFFICE O/P EST MOD 30 MIN: CPT | Mod: S$GLB,,, | Performed by: STUDENT IN AN ORGANIZED HEALTH CARE EDUCATION/TRAINING PROGRAM

## 2023-09-13 PROCEDURE — 1160F PR REVIEW ALL MEDS BY PRESCRIBER/CLIN PHARMACIST DOCUMENTED: ICD-10-PCS | Mod: CPTII,S$GLB,, | Performed by: STUDENT IN AN ORGANIZED HEALTH CARE EDUCATION/TRAINING PROGRAM

## 2023-09-13 PROCEDURE — 1159F PR MEDICATION LIST DOCUMENTED IN MEDICAL RECORD: ICD-10-PCS | Mod: CPTII,S$GLB,, | Performed by: STUDENT IN AN ORGANIZED HEALTH CARE EDUCATION/TRAINING PROGRAM

## 2023-09-13 PROCEDURE — 1159F MED LIST DOCD IN RCRD: CPT | Mod: CPTII,S$GLB,, | Performed by: STUDENT IN AN ORGANIZED HEALTH CARE EDUCATION/TRAINING PROGRAM

## 2023-09-13 PROCEDURE — 99214 PR OFFICE/OUTPT VISIT, EST, LEVL IV, 30-39 MIN: ICD-10-PCS | Mod: S$GLB,,, | Performed by: STUDENT IN AN ORGANIZED HEALTH CARE EDUCATION/TRAINING PROGRAM

## 2023-09-13 PROCEDURE — 1160F RVW MEDS BY RX/DR IN RCRD: CPT | Mod: CPTII,S$GLB,, | Performed by: STUDENT IN AN ORGANIZED HEALTH CARE EDUCATION/TRAINING PROGRAM

## 2023-09-13 RX ORDER — SULFACETAMIDE SODIUM, SULFUR 100; 50 MG/G; MG/G
EMULSION TOPICAL
Qty: 360 G | Refills: 3 | Status: SHIPPED | OUTPATIENT
Start: 2023-09-13

## 2023-09-13 NOTE — PROGRESS NOTES
Subjective:      Patient ID:  Meri Krishna is a 38 y.o. female who presents for   Chief Complaint   Patient presents with    Seborrheic Dermatitis     Nose comes and goes     LOV 03/29/23    Patient coming in today for a follow up on seborrheic dermatitis.   States that she is still having some itchiness on her scalp fluocinonide is helping. States she is having to wash her hair more than she wants to due to her working out.   States that she does still get dry skin around her nose but the ketoconazole and the triamcinolone is helping.   States that she feels her hair is getting thinner around the crown area.     Derm Hx  Denies Phx of NMSC  Denies Fhx of MM        Review of Systems   Constitutional:  Negative for fever, chills and fatigue.   Respiratory:  Negative for cough and shortness of breath.    Gastrointestinal:  Negative for nausea and vomiting.   Skin:  Positive for itching and dry skin. Negative for rash.       Objective:   Physical Exam   Constitutional: She appears well-developed and well-nourished.   Neurological: She is alert and oriented to person, place, and time.   Psychiatric: She has a normal mood and affect.   Skin:   Areas Examined (abnormalities noted in diagram):   Scalp / Hair Palpated and Inspected  Head / Face Inspection Performed            Diagram Legend     Erythematous scaling macule/papule c/w actinic keratosis       Vascular papule c/w angioma      Pigmented verrucoid papule/plaque c/w seborrheic keratosis      Yellow umbilicated papule c/w sebaceous hyperplasia      Irregularly shaped tan macule c/w lentigo     1-2 mm smooth white papules consistent with Milia      Movable subcutaneous cyst with punctum c/w epidermal inclusion cyst      Subcutaneous movable cyst c/w pilar cyst      Firm pink to brown papule c/w dermatofibroma      Pedunculated fleshy papule(s) c/w skin tag(s)      Evenly pigmented macule c/w junctional nevus     Mildly variegated pigmented, slightly  irregular-bordered macule c/w mildly atypical nevus      Flesh colored to evenly pigmented papule c/w intradermal nevus       Pink pearly papule/plaque c/w basal cell carcinoma      Erythematous hyperkeratotic cursted plaque c/w SCC      Surgical scar with no sign of skin cancer recurrence      Open and closed comedones      Inflammatory papules and pustules      Verrucoid papule consistent consistent with wart     Erythematous eczematous patches and plaques     Dystrophic onycholytic nail with subungual debris c/w onychomycosis     Umbilicated papule    Erythematous-base heme-crusted tan verrucoid plaque consistent with inflamed seborrheic keratosis     Erythematous Silvery Scaling Plaque c/w Psoriasis     See annotation        Assessment / Plan:        Seborrheic dermatitis  -     sulfacetamide sodium-sulfur 10-5 % (w/w) Clsr; Wash face qday - bid  Dispense: 360 g; Refill: 3  - face: mix triamcinolone 0.025% cream (only use when flaring) with ketoconazole cream (ok for maintenance) and apply twice daily x 2 weeks  -scalp: fluocinonide solution twice daily x 2 weeks  - did not have great results with zinc soap  Will change to sulfa wash         No follow-ups on file.

## 2023-09-16 LAB
ALBUMIN SERPL-MCNC: 4.7 G/DL (ref 3.6–5.1)
ALBUMIN/GLOB SERPL: 1.7 (CALC) (ref 1–2.5)
ALP SERPL-CCNC: 63 U/L (ref 31–125)
ALT SERPL-CCNC: 22 U/L (ref 6–29)
APPEARANCE UR: CLEAR
AST SERPL-CCNC: 19 U/L (ref 10–30)
BACTERIA #/AREA URNS HPF: NORMAL /HPF
BACTERIA UR CULT: NORMAL
BASOPHILS # BLD AUTO: 48 CELLS/UL (ref 0–200)
BASOPHILS NFR BLD AUTO: 0.7 %
BILIRUB SERPL-MCNC: 0.3 MG/DL (ref 0.2–1.2)
BILIRUB UR QL STRIP: NEGATIVE
BUN SERPL-MCNC: 23 MG/DL (ref 7–25)
BUN/CREAT SERPL: ABNORMAL (CALC) (ref 6–22)
CALCIUM SERPL-MCNC: 10.1 MG/DL (ref 8.6–10.2)
CHLORIDE SERPL-SCNC: 100 MMOL/L (ref 98–110)
CHOLEST SERPL-MCNC: 275 MG/DL
CHOLEST/HDLC SERPL: 2.9 (CALC)
CO2 SERPL-SCNC: 25 MMOL/L (ref 20–32)
COLOR UR: YELLOW
CREAT SERPL-MCNC: 0.84 MG/DL (ref 0.5–0.99)
EGFR: 90 ML/MIN/1.73M2
EOSINOPHIL # BLD AUTO: 82 CELLS/UL (ref 15–500)
EOSINOPHIL NFR BLD AUTO: 1.2 %
ERYTHROCYTE [DISTWIDTH] IN BLOOD BY AUTOMATED COUNT: 12.6 % (ref 11–15)
GLOBULIN SER CALC-MCNC: 2.8 G/DL (CALC) (ref 1.9–3.7)
GLUCOSE SERPL-MCNC: 96 MG/DL (ref 65–99)
GLUCOSE UR QL STRIP: NEGATIVE
HCT VFR BLD AUTO: 42.3 % (ref 35–45)
HDLC SERPL-MCNC: 94 MG/DL
HGB BLD-MCNC: 13.7 G/DL (ref 11.7–15.5)
HGB UR QL STRIP: NEGATIVE
HYALINE CASTS #/AREA URNS LPF: NORMAL /LPF
KETONES UR QL STRIP: NEGATIVE
LDLC SERPL CALC-MCNC: 157 MG/DL (CALC)
LEUKOCYTE ESTERASE UR QL STRIP: NEGATIVE
LYMPHOCYTES # BLD AUTO: 2285 CELLS/UL (ref 850–3900)
LYMPHOCYTES NFR BLD AUTO: 33.6 %
MCH RBC QN AUTO: 29.1 PG (ref 27–33)
MCHC RBC AUTO-ENTMCNC: 32.4 G/DL (ref 32–36)
MCV RBC AUTO: 90 FL (ref 80–100)
MONOCYTES # BLD AUTO: 598 CELLS/UL (ref 200–950)
MONOCYTES NFR BLD AUTO: 8.8 %
NEUTROPHILS # BLD AUTO: 3788 CELLS/UL (ref 1500–7800)
NEUTROPHILS NFR BLD AUTO: 55.7 %
NITRITE UR QL STRIP: NEGATIVE
NONHDLC SERPL-MCNC: 181 MG/DL (CALC)
PH UR STRIP: 7 [PH] (ref 5–8)
PLATELET # BLD AUTO: 389 THOUSAND/UL (ref 140–400)
PMV BLD REES-ECKER: 10.5 FL (ref 7.5–12.5)
POTASSIUM SERPL-SCNC: 4.5 MMOL/L (ref 3.5–5.3)
PROT SERPL-MCNC: 7.5 G/DL (ref 6.1–8.1)
PROT UR QL STRIP: NEGATIVE
RBC # BLD AUTO: 4.7 MILLION/UL (ref 3.8–5.1)
RBC #/AREA URNS HPF: NORMAL /HPF
SERVICE CMNT-IMP: NORMAL
SODIUM SERPL-SCNC: 134 MMOL/L (ref 135–146)
SP GR UR STRIP: 1.01 (ref 1–1.03)
SQUAMOUS #/AREA URNS HPF: NORMAL /HPF
TRIGL SERPL-MCNC: 120 MG/DL
TSH SERPL-ACNC: 0.93 MIU/L
WBC # BLD AUTO: 6.8 THOUSAND/UL (ref 3.8–10.8)
WBC #/AREA URNS HPF: NORMAL /HPF

## 2023-10-03 ENCOUNTER — OFFICE VISIT (OUTPATIENT)
Dept: ORTHOPEDICS | Facility: CLINIC | Age: 38
End: 2023-10-03
Payer: COMMERCIAL

## 2023-10-03 ENCOUNTER — HOSPITAL ENCOUNTER (OUTPATIENT)
Dept: RADIOLOGY | Facility: HOSPITAL | Age: 38
Discharge: HOME | End: 2023-10-03
Attending: ORTHOPAEDIC SURGERY
Payer: COMMERCIAL

## 2023-10-03 VITALS — WEIGHT: 158.94 LBS | HEIGHT: 63 IN | BODY MASS INDEX: 28.16 KG/M2

## 2023-10-03 DIAGNOSIS — M25.551 BILATERAL HIP PAIN: Primary | ICD-10-CM

## 2023-10-03 DIAGNOSIS — M25.552 BILATERAL HIP PAIN: Primary | ICD-10-CM

## 2023-10-03 DIAGNOSIS — M25.551 BILATERAL HIP PAIN: ICD-10-CM

## 2023-10-03 DIAGNOSIS — M25.552 BILATERAL HIP PAIN: ICD-10-CM

## 2023-10-03 PROCEDURE — 99999 PR PBB SHADOW E&M-EST. PATIENT-LVL III: ICD-10-PCS | Mod: PBBFAC,,, | Performed by: ORTHOPAEDIC SURGERY

## 2023-10-03 PROCEDURE — 73521 XR HIPS BILATERAL 2 VIEW INCL AP PELVIS: ICD-10-PCS | Mod: 26,,, | Performed by: RADIOLOGY

## 2023-10-03 PROCEDURE — 99214 OFFICE O/P EST MOD 30 MIN: CPT | Mod: S$GLB,,, | Performed by: ORTHOPAEDIC SURGERY

## 2023-10-03 PROCEDURE — 99999 PR PBB SHADOW E&M-EST. PATIENT-LVL III: CPT | Mod: PBBFAC,,, | Performed by: ORTHOPAEDIC SURGERY

## 2023-10-03 PROCEDURE — 1160F PR REVIEW ALL MEDS BY PRESCRIBER/CLIN PHARMACIST DOCUMENTED: ICD-10-PCS | Mod: CPTII,S$GLB,, | Performed by: ORTHOPAEDIC SURGERY

## 2023-10-03 PROCEDURE — 73521 X-RAY EXAM HIPS BI 2 VIEWS: CPT | Mod: TC,PO

## 2023-10-03 PROCEDURE — 1159F PR MEDICATION LIST DOCUMENTED IN MEDICAL RECORD: ICD-10-PCS | Mod: CPTII,S$GLB,, | Performed by: ORTHOPAEDIC SURGERY

## 2023-10-03 PROCEDURE — 1160F RVW MEDS BY RX/DR IN RCRD: CPT | Mod: CPTII,S$GLB,, | Performed by: ORTHOPAEDIC SURGERY

## 2023-10-03 PROCEDURE — 3008F PR BODY MASS INDEX (BMI) DOCUMENTED: ICD-10-PCS | Mod: CPTII,S$GLB,, | Performed by: ORTHOPAEDIC SURGERY

## 2023-10-03 PROCEDURE — 73521 X-RAY EXAM HIPS BI 2 VIEWS: CPT | Mod: 26,,, | Performed by: RADIOLOGY

## 2023-10-03 PROCEDURE — 99214 PR OFFICE/OUTPT VISIT, EST, LEVL IV, 30-39 MIN: ICD-10-PCS | Mod: S$GLB,,, | Performed by: ORTHOPAEDIC SURGERY

## 2023-10-03 PROCEDURE — 3008F BODY MASS INDEX DOCD: CPT | Mod: CPTII,S$GLB,, | Performed by: ORTHOPAEDIC SURGERY

## 2023-10-03 PROCEDURE — 1159F MED LIST DOCD IN RCRD: CPT | Mod: CPTII,S$GLB,, | Performed by: ORTHOPAEDIC SURGERY

## 2023-10-03 NOTE — PROGRESS NOTES
CC:  38-year-old female presents for evaluation of bilateral hip pain and lumbosacral pain.  The patient reports pain with basic activities such as getting dressed in the morning.  She also reports pain at night.  She rates her pain as a 5/10.  When asked to localize her pain she has pain in the groin on both the right and left side but she also has lumbosacral pain on the right and left side as well.    Past Medical History:   Diagnosis Date    Hypertension     Kidney stone     Migraine        Past Surgical History:   Procedure Laterality Date    AUGMENTATION OF BREAST      CYST REMOVAL Right 2005    KIDNEY STONE SURGERY         Current Outpatient Medications on File Prior to Visit   Medication Sig Dispense Refill    biotin 1,000 mcg Chew Take by mouth.      drospirenone-e.estradioL-lm.FA (BEYAZ/LYDIA) 3-0.02-0.451 mg (24) (4) Tab       fluocinonide (LIDEX) 0.05 % external solution Apply topically 2 (two) times daily. 60 mL 2    ketoconazole (NIZORAL) 2 % cream Apply topically 2 (two) times daily. 60 g 2    omeprazole (PRILOSEC) 20 MG capsule Take 20 mg by mouth once daily.      ondansetron (ZOFRAN-ODT) 4 MG TbDL Take 1 tablet (4 mg total) by mouth every 8 (eight) hours as needed. 10 tablet 0    spironolactone (ALDACTONE) 50 MG tablet Take 50 mg by mouth 2 (two) times daily.      sulfacetamide sodium-sulfur 10-5 % (w/w) Clsr Wash face qday - bid 360 g 3    sumatriptan (IMITREX) 50 MG tablet Take 1 tablet (50 mg total) by mouth daily as needed for Migraine. May repeat in 2 hours if needed. Max 100mg/24 hrs 9 tablet 5    triamcinolone acetonide 0.025% (KENALOG) 0.025 % cream Apply topically 2 (two) times daily. 80 g 1     No current facility-administered medications on file prior to visit.       ROS:    Constitution: Denies chills, fever, and sweats.  HENT: Denies headaches or blurry vision.  Cardiovascular: Denies chest pain or irregular heart beat.  Respiratory: Denies cough or shortness of  breath.  Gastrointestinal: Denies abdominal pain, nausea, or vomiting.  Genitourinary:  Denies urinary incontinence, bladder and kidney issues  Musculoskeletal:  Denies muscle cramps.  Neurological: Denies dizziness or focal weakness.  Psychiatric/Behavioral: Normal mental status.  Hematologic/Lymphatic: Denies bleeding problem or easy bruising/bleeding.  Skin: Denies rash or suspicious lesions.    Physical examination     Gen - No acute distress, well nourished, well groomed   Eyes - Extraoccular motions intact, pupils equally round and reactive to light and accommodation   ENT - normocephalic, atruamtic, oropharynx clear   Neck - Supple, no abnormal masses   Cardiovascular - regular rate and rhythm   Pulmonary - clear to auscultation bilaterally, no wheezes, ronchi, or rales   Abdomen - soft, non-tender, non-distended, positive bowel sounds   Psych - The patient is alert and oriented x3 with normal mood and affect    Examination of the Right Lower Extremity    Skin is intact throughout  Motor in intact EHL,FHL,TA,shree  +2 DP/PT  Sensation LT intact D/P/1st    Examination of the Right Hip    C-Sign positive  Logroll negative  Stenchfield positive    Pain with ROM positive    ROM:    Flexion   120  Extension   30  Abduction   45  Adduction   20  External Rotation 45  Internal Rotation 35    Flexion contracture negative    FADIR positive  FADER negative    Tenderness to palpation over lateral and posterolateral greater tochanter negative    Examination of the Left Lower Extremity    Skin is intact throughout  Motor in intact EHL,FHL,TA,shree  +2 DP/PT  Sensation LT intact D/P/1st    Examination of the Left Hip    C-Sign positive  Logroll negative  Stenchfield positive    Pain with ROM negative    ROM:    Flexion   120  Extension   30  Abduction   45  Adduction   20  External Rotation 45  Internal Rotation 35    Flexion contracture negative    FADIR positive  FADER positive    Tenderness to palpation over lateral and  posterolateral greater tochanter negative    Tenderness is noted over the lumbosacral spine    X-ray images were examined and personally interpreted by me.  Three views of bilateral hips dated 10/03/2023 show both femoral heads are well reduced in the acetabuli with no advanced arthritic changes and no acute fractures.    Dx:  Bilateral hip pain, concern is for labral pathology.  The patient also has lumbosacral pain.    Plan:  With regards to the hip pain we are going to obtain MRIs of both of her hips to evaluate for labral tearing.  We will also refer [facility] to [facility] for her lumbosacral pain.  Follow up after the MRIs.

## 2023-10-12 ENCOUNTER — HOSPITAL ENCOUNTER (OUTPATIENT)
Dept: RADIOLOGY | Facility: HOSPITAL | Age: 38
Discharge: HOME | End: 2023-10-12
Attending: ORTHOPAEDIC SURGERY
Payer: COMMERCIAL

## 2023-10-12 DIAGNOSIS — M25.551 BILATERAL HIP PAIN: ICD-10-CM

## 2023-10-12 DIAGNOSIS — M25.552 BILATERAL HIP PAIN: ICD-10-CM

## 2023-10-12 PROCEDURE — 73721 MRI JNT OF LWR EXTRE W/O DYE: CPT | Mod: TC,PO,LT

## 2023-10-12 PROCEDURE — 73721 MRI JNT OF LWR EXTRE W/O DYE: CPT | Mod: TC,PO,RT

## 2024-05-08 DIAGNOSIS — Z12.31 OTHER SCREENING MAMMOGRAM: ICD-10-CM

## 2024-05-13 ENCOUNTER — PATIENT MESSAGE (OUTPATIENT)
Dept: ADMINISTRATIVE | Facility: HOSPITAL | Age: 39
End: 2024-05-13
Payer: COMMERCIAL

## 2024-05-23 ENCOUNTER — OFFICE VISIT (OUTPATIENT)
Dept: DERMATOLOGY | Facility: CLINIC | Age: 39
End: 2024-05-23
Payer: COMMERCIAL

## 2024-05-23 DIAGNOSIS — L65.9 ALOPECIA: Primary | ICD-10-CM

## 2024-05-23 PROCEDURE — 1159F MED LIST DOCD IN RCRD: CPT | Mod: CPTII,S$GLB,, | Performed by: STUDENT IN AN ORGANIZED HEALTH CARE EDUCATION/TRAINING PROGRAM

## 2024-05-23 PROCEDURE — 1160F RVW MEDS BY RX/DR IN RCRD: CPT | Mod: CPTII,S$GLB,, | Performed by: STUDENT IN AN ORGANIZED HEALTH CARE EDUCATION/TRAINING PROGRAM

## 2024-05-23 PROCEDURE — 99214 OFFICE O/P EST MOD 30 MIN: CPT | Mod: S$GLB,,, | Performed by: STUDENT IN AN ORGANIZED HEALTH CARE EDUCATION/TRAINING PROGRAM

## 2024-05-23 RX ORDER — CHOLECALCIFEROL (VITAMIN D3) 25 MCG
1000 TABLET ORAL DAILY
COMMUNITY

## 2024-05-23 RX ORDER — SPIRONOLACTONE 50 MG/1
TABLET, FILM COATED ORAL
Qty: 90 TABLET | Refills: 11 | Status: SHIPPED | OUTPATIENT
Start: 2024-05-23

## 2024-05-23 NOTE — PROGRESS NOTES
Subjective:      Patient ID:  Meri Krishna is a 39 y.o. female who presents for   Chief Complaint   Patient presents with    Hair Loss     Hair loss     LOV 09/13/2023    Patient is coming in today for hair loss. States that it has been about 3 months where she has noticed her hair falling out more. Can see her scalp more  Wears her hair up.  No hx of dying hair, has never had clip in or weaves.   No pain.   Some itching.   No Fhx of hair loss  Was taking bioT pellets for 2-3 years, stopped this in December, last one was October.     Takes beyaz daily because of ovarian cysts  Spironolactone 100mg     Current Outpatient Medications:   ·  biotin 1,000 mcg Chew, Take by mouth., Disp: , Rfl:   ·  drospirenone-e.estradioL-lm.FA (BEYAZ/LYDIA) 3-0.02-0.451 mg (24) (4) Tab, , Disp: , Rfl:   ·  fluocinonide (LIDEX) 0.05 % external solution, Apply topically 2 (two) times daily., Disp: 60 mL, Rfl: 2  ·  ketoconazole (NIZORAL) 2 % cream, Apply topically 2 (two) times daily., Disp: 60 g, Rfl: 2  ·  omeprazole (PRILOSEC) 20 MG capsule, Take 20 mg by mouth once daily., Disp: , Rfl:   ·  ondansetron (ZOFRAN-ODT) 4 MG TbDL, Take 1 tablet (4 mg total) by mouth every 8 (eight) hours as needed., Disp: 10 tablet, Rfl: 0  ·  spironolactone (ALDACTONE) 50 MG tablet, Take 50 mg by mouth 2 (two) times daily., Disp: , Rfl:   ·  sulfacetamide sodium-sulfur 10-5 % (w/w) Clsr, Wash face qday - bid, Disp: 360 g, Rfl: 3  ·  sumatriptan (IMITREX) 50 MG tablet, Take 1 tablet (50 mg total) by mouth daily as needed for Migraine. May repeat in 2 hours if needed. Max 100mg/24 hrs, Disp: 9 tablet, Rfl: 5  ·  triamcinolone acetonide 0.025% (KENALOG) 0.025 % cream, Apply topically 2 (two) times daily., Disp: 80 g, Rfl: 1  ·  vitamin D (VITAMIN D3) 1000 units Tab, Take 1,000 Units by mouth once daily., Disp: , Rfl:         Review of Systems   Constitutional:  Negative for fever, chills and fatigue.   Respiratory:  Negative for cough and shortness of  breath.    Gastrointestinal:  Negative for nausea and vomiting.   Skin:  Positive for daily sunscreen use and activity-related sunscreen use. Negative for itching, rash and dry skin.   Hematologic/Lymphatic: Does not bruise/bleed easily.       Objective:   Physical Exam   Constitutional: She appears well-developed and well-nourished.   Neurological: She is alert and oriented to person, place, and time.   Psychiatric: She has a normal mood and affect.   Skin:   Areas Examined (abnormalities noted in diagram):   Scalp / Hair Palpated and Inspected            Diagram Legend     Erythematous scaling macule/papule c/w actinic keratosis       Vascular papule c/w angioma      Pigmented verrucoid papule/plaque c/w seborrheic keratosis      Yellow umbilicated papule c/w sebaceous hyperplasia      Irregularly shaped tan macule c/w lentigo     1-2 mm smooth white papules consistent with Milia      Movable subcutaneous cyst with punctum c/w epidermal inclusion cyst      Subcutaneous movable cyst c/w pilar cyst      Firm pink to brown papule c/w dermatofibroma      Pedunculated fleshy papule(s) c/w skin tag(s)      Evenly pigmented macule c/w junctional nevus     Mildly variegated pigmented, slightly irregular-bordered macule c/w mildly atypical nevus      Flesh colored to evenly pigmented papule c/w intradermal nevus       Pink pearly papule/plaque c/w basal cell carcinoma      Erythematous hyperkeratotic cursted plaque c/w SCC      Surgical scar with no sign of skin cancer recurrence      Open and closed comedones      Inflammatory papules and pustules      Verrucoid papule consistent consistent with wart     Erythematous eczematous patches and plaques     Dystrophic onycholytic nail with subungual debris c/w onychomycosis     Umbilicated papule    Erythematous-base heme-crusted tan verrucoid plaque consistent with inflamed seborrheic keratosis     Erythematous Silvery Scaling Plaque c/w Psoriasis     See  annotation            Assessment / Plan:        Alopecia  -     ZINC; Future; Expected date: 05/23/2024  -     FERRITIN; Future; Expected date: 05/23/2024  -     IRON AND TIBC; Future; Expected date: 05/23/2024  -     CBC Auto Differential; Future; Expected date: 05/23/2024  -     Comprehensive Metabolic Panel; Future; Expected date: 05/23/2024  -     spironolactone (ALDACTONE) 50 MG tablet; Take 150mg daily  Dispense: 90 tablet; Refill: 11  Ask pcp to order vitamin D  Was taking hormone pellets, d/c in December 2023  Suspect hormones contributing to hair loss, possible component of traction alopecia, recommend loose hair styles  Increase spironolactone to 150mg daily   Continue rogaine 5% foam BID  Discussed benefits and risks of therapy including but not limited to breakthrough bleeding, breast tenderness, and elevated potassium levels which may give symptoms of fatigue, palpitations, and nausea. Patient should limit potassium intake - avoid potassium supplements or salt substitutes, limit bananas and citrus fruits. Pregnancy must be avoided while taking spironolactone.             No follow-ups on file.

## 2024-05-29 ENCOUNTER — HOSPITAL ENCOUNTER (OUTPATIENT)
Dept: RADIOLOGY | Facility: CLINIC | Age: 39
Discharge: HOME | End: 2024-05-29
Attending: FAMILY MEDICINE
Payer: COMMERCIAL

## 2024-05-29 DIAGNOSIS — Z12.31 OTHER SCREENING MAMMOGRAM: ICD-10-CM

## 2024-05-29 PROCEDURE — 77067 SCR MAMMO BI INCL CAD: CPT | Mod: 26,,, | Performed by: RADIOLOGY

## 2024-05-29 PROCEDURE — 77063 BREAST TOMOSYNTHESIS BI: CPT | Mod: 26,,, | Performed by: RADIOLOGY

## 2024-05-29 PROCEDURE — 77067 SCR MAMMO BI INCL CAD: CPT | Mod: TC,PO

## 2024-06-12 ENCOUNTER — HOSPITAL ENCOUNTER (OUTPATIENT)
Dept: RADIOLOGY | Facility: CLINIC | Age: 39
Discharge: HOME | End: 2024-06-12
Attending: PODIATRIST
Payer: COMMERCIAL

## 2024-06-12 ENCOUNTER — OFFICE VISIT (OUTPATIENT)
Dept: PODIATRY | Facility: CLINIC | Age: 39
End: 2024-06-12
Payer: COMMERCIAL

## 2024-06-12 VITALS — BODY MASS INDEX: 29.14 KG/M2 | HEIGHT: 63 IN | WEIGHT: 164.44 LBS

## 2024-06-12 DIAGNOSIS — M21.41 PES PLANUS OF BOTH FEET: ICD-10-CM

## 2024-06-12 DIAGNOSIS — M79.671 RIGHT FOOT PAIN: ICD-10-CM

## 2024-06-12 DIAGNOSIS — M76.829 POSTERIOR TIBIAL TENDON DYSFUNCTION: Primary | ICD-10-CM

## 2024-06-12 DIAGNOSIS — M21.42 PES PLANUS OF BOTH FEET: ICD-10-CM

## 2024-06-12 PROCEDURE — 3008F BODY MASS INDEX DOCD: CPT | Mod: CPTII,S$GLB,, | Performed by: PODIATRIST

## 2024-06-12 PROCEDURE — 99999 PR PBB SHADOW E&M-EST. PATIENT-LVL III: CPT | Mod: PBBFAC,,, | Performed by: PODIATRIST

## 2024-06-12 PROCEDURE — 73630 X-RAY EXAM OF FOOT: CPT | Mod: RT,S$GLB,, | Performed by: RADIOLOGY

## 2024-06-12 PROCEDURE — 99213 OFFICE O/P EST LOW 20 MIN: CPT | Mod: S$GLB,,, | Performed by: PODIATRIST

## 2024-06-12 RX ORDER — METHYLPREDNISOLONE 4 MG/1
TABLET ORAL
Qty: 21 EACH | Refills: 0 | Status: SHIPPED | OUTPATIENT
Start: 2024-06-12 | End: 2024-06-26

## 2024-06-12 RX ORDER — LEVOTHYROXINE, LIOTHYRONINE 38; 9 UG/1; UG/1
60 TABLET ORAL
COMMUNITY
Start: 2024-03-30

## 2024-06-12 NOTE — PATIENT INSTRUCTIONS
Understanding Posterior Tibialis Tenosynovitis    The posterior tibialis tendon runs along the inside of the foot. It connects the calf muscle (posterior tibialis muscle) to bones on the inside of the foot. It helps maintain the arch of the foot. It also gives you stability when you move. Posterior tibialis tenosynovitis is when this tendon becomes inflamed or torn.     How to say it  [id number]     What causes posterior tibialis tenosynovitis?  This condition is often caused by an injury to the tendon. For instance, a fall can tear the tendon. Overuse can also damage it. People who play sports like basketball or tennis a lot may weaken the tendon over time.    Symptoms of posterior tibialis tenosynovitis  The symptoms of this condition include pain and swelling. The pain is usually felt near the tendon, on the inside of the foot and ankle. It often gets worse over time or with an increase in activity. Your arch may eventually fall, leading to a flat foot. Your foot may also start to turn outward.    Treatment for posterior tibialis tenosynovitis  Treatment for this condition depends on the severity of the tear. Treatment may include:  Rest. You should avoid any activities that cause pain and swelling. You may also need to limit the amount of weight you put on your injured foot.  Cold packs. Putting a cold pack on the tendon may reduce pain and swelling.  Medicine. Over-the-counter pain medicines can reduce pain and swelling.  Leg cast or walking boot. Severe tears of the posterior tibialis tendon may need to be kept from moving. These devices can help protect the tendon and reduce swelling.  Shoe insert or brace. Like a leg cast or walking boot, these devices can help protect the tendon as it heals. They may also ease pain.  Strengthening and stretching exercises. Certain exercises can help you regain strength and flexibility in your foot..  Surgery. Several surgical choices are available to  fix the torn tendon or replace it. But you often do not need surgery unless your symptoms do not get better after trying other treatments for at least 6 months.     When to call your healthcare provider  Call your healthcare provider right away if you have any of these:  Fever of 100.4°F (38°C) or higher, or as directed  Pain that gets worse  Symptoms that dont get better, or get worse  New symptoms      Date Last Reviewed: 3/10/2016  © 3720-1704 [employer]. [address] [place] [address], [place] [address]. All rights reserved. This information is not intended as a substitute for professional medical care. Always follow your healthcare professional's instructions.

## 2024-06-12 NOTE — PROGRESS NOTES
"  [address] [facility] [address]  [place] [address]  Phone: [phone]   [phone]    Patient's PCP:Ashu Hoyt MD  Referring Provider: No ref. provider found    Subjective:      Chief Complaint:: Foot Pain (Bilateral arch pain up medially toward ankle, right worse )    Foot Pain  Associated symptoms include myalgias. Pertinent negatives include no abdominal pain, arthralgias, chest pain, chills, coughing, fatigue, fever, headaches, joint swelling, nausea, neck pain, numbness, rash or weakness.     Meri Krishna is a 39 y.o. female who presents today with a complaint of bilateral arch pain up medially toward the ankle right foot worse . The current episode started about a year ago.  The symptoms include aching. Probable cause of complaint unknown, denies injury.  The symptoms are aggravated by prolonged walking and standing. The problem has stayed the same. Treatment to date have included  otc pain meds, rest, ice, and elevation. which provided some relief.     Vitals:    06/12/24 1342   Weight: 74.6 kg (164 lb 7.4 oz)   Height: 5' 3" (1.6 m)   PainSc:   3      Shoe Size: 7.5-8    Past Surgical History:   Procedure Laterality Date    AUGMENTATION OF BREAST      CYST REMOVAL Right 2005    KIDNEY STONE SURGERY       Past Medical History:   Diagnosis Date    Hypertension     Kidney stone     Migraine      Family History   Problem Relation Name Age of Onset    Hypertension Mother      Obesity Mother      Diabetes Father      Hypertension Father      Hyperlipidemia Father      Stroke Father      Lung cancer Maternal Grandfather          Social History:   Marital Status:   Alcohol History:  reports current alcohol use.  Tobacco History:  reports that she has never smoked. She has never used smokeless tobacco.  Drug History:  reports no history of drug use.    Review of patient's allergies indicates:   Allergen Reactions    Penicillins Hives, Shortness Of Breath and Swelling    Yeast        Current " Outpatient Medications   Medication Sig Dispense Refill    NP THYROID 60 mg Tab Take 60 mg by mouth.      biotin 1,000 mcg Chew Take by mouth.      cyanocobalamin (VITAMIN B-12) 1000 MCG tablet Take 100 mcg by mouth once daily.      drospirenone-e.estradioL-lm.FA (BEYAZ/LYDIA) 3-0.02-0.451 mg (24) (4) Tab       fluocinonide (LIDEX) 0.05 % external solution Apply topically 2 (two) times daily. 60 mL 2    ketoconazole (NIZORAL) 2 % cream Apply topically 2 (two) times daily. 60 g 2    omeprazole (PRILOSEC) 40 MG capsule Take 1 capsule (40 mg total) by mouth once daily. 30 capsule 5    ondansetron (ZOFRAN-ODT) 4 MG TbDL Take 1 tablet (4 mg total) by mouth every 8 (eight) hours as needed. 10 tablet 0    spironolactone (ALDACTONE) 50 MG tablet Take 150mg daily 90 tablet 11    sulfacetamide sodium-sulfur 10-5 % (w/w) Clsr Wash face qday - bid 360 g 3    sumatriptan (IMITREX) 50 MG tablet Take 1 tablet (50 mg total) by mouth daily as needed for Migraine. May repeat in 2 hours if needed. Max 100mg/24 hrs 9 tablet 5    triamcinolone acetonide 0.025% (KENALOG) 0.025 % cream Apply topically 2 (two) times daily. 80 g 1    vitamin D (VITAMIN D3) 1000 units Tab Take 1,000 Units by mouth once daily.       No current facility-administered medications for this visit.       Review of Systems   Constitutional:  Negative for chills, fatigue, fever and unexpected weight change.   HENT:  Negative for hearing loss and trouble swallowing.    Eyes:  Negative for photophobia and visual disturbance.   Respiratory:  Negative for cough, shortness of breath and wheezing.    Cardiovascular:  Negative for chest pain, palpitations and leg swelling.   Gastrointestinal:  Negative for abdominal pain and nausea.   Genitourinary:  Negative for dysuria and frequency.   Musculoskeletal:  Positive for myalgias. Negative for arthralgias, back pain, gait problem, joint swelling and neck pain.   Skin:  Negative for rash and wound.   Neurological:  Negative  for tremors, seizures, weakness, numbness and headaches.   Hematological:  Does not bruise/bleed easily.         Objective:        Physical Exam:   Foot Exam    General  General Appearance: appears stated age and healthy   Orientation: alert and oriented to person, place, and time   Affect: appropriate   Gait: unimpaired       Right Foot/Ankle     Inspection and Palpation  Ecchymosis: none  Tenderness: (Medial foot and ankle along PT tendon)  Swelling: none   Arch: pes planus  Skin Exam: skin intact; no drainage, no ulcer and no erythema   Neurovascular  Dorsalis pedis: 2+  Posterior tibial: 2+  Capillary Refill: 2+  Varicose veins: not present  Saphenous nerve sensation: normal  Tibial nerve sensation: normal  Superficial peroneal nerve sensation: normal  Deep peroneal nerve sensation: normal  Sural nerve sensation: normal    Edema  Type of edema: non-pitting    Muscle Strength  Ankle dorsiflexion: 5  Ankle plantar flexion: 5  Ankle inversion: 5  Ankle eversion: 5  Great toe extension: 5  Great toe flexion: 5    Range of Motion    Passive  Ankle dorsiflexion: 5    Active  Ankle inversion: pain    Tests  Anterior drawer: negative   Talar tilt: negative   PT Tinel's sign: negative    Paresthesia: negative    Left Foot/Ankle      Inspection and Palpation  Ecchymosis: none  Tenderness: (Medial foot and ankle along PT tendon)  Swelling: none   Arch: pes planus  Skin Exam: skin intact; no drainage, no ulcer and no erythema   Neurovascular  Dorsalis pedis: 2+  Posterior tibial: 2+  Capillary refill: 2+  Varicose veins: not present  Saphenous nerve sensation: normal  Tibial nerve sensation: normal  Superficial peroneal nerve sensation: normal  Deep peroneal nerve sensation: normal  Sural nerve sensation: normal    Edema  Type of edema: non-pitting    Muscle Strength  Ankle dorsiflexion: 5  Ankle plantar flexion: 5  Ankle inversion: 5  Ankle eversion: 5  Great toe extension: 5  Great toe flexion: 5    Range of  Motion    Passive  Ankle dorsiflexion: 5    Active  Ankle inversion: pain    Tests  Anterior drawer: negative   Talar tilt: negative   PT Tinel's sign: negative  Paresthesia: negative      Physical Exam  Cardiovascular:      Pulses:           Dorsalis pedis pulses are 2+ on the right side and 2+ on the left side.        Posterior tibial pulses are 2+ on the right side and 2+ on the left side.   Feet:      Right foot:      Skin integrity: No ulcer or erythema.      Left foot:      Skin integrity: No ulcer or erythema.                   Muscle Strength   Right Lower Extremity   Ankle Dorsiflexion:  5   Plantar flexion:  5/5  Left Lower Extremity   Ankle Dorsiflexion:  5   Plantar flexion:  5/5     Vascular Exam     Right Pulses  Dorsalis Pedis:      2+  Posterior Tibial:      2+        Left Pulses  Dorsalis Pedis:      2+  Posterior Tibial:      2+           Imaging: X-Ray Foot Complete Right  Narrative: EXAMINATION:  XR FOOT COMPLETE 3 VIEW RIGHT    CLINICAL HISTORY:  . Pain in right foot    TECHNIQUE:  AP, lateral, and oblique views of the right foot were performed.    COMPARISON:  None    FINDINGS:  A fracture of the bones of the right foot is not seen.  Juxta-articular bone erosion or Osteoporosis, joint space narrowing or spur formation is not seen.  Soft tissue swelling or foreign bodies are not seen.  Impression: Negative radiographs of the right foot.    Electronically signed by: Driss Dacosta MD  Date:    06/12/2024  Time:    14:26               Assessment:       1. Posterior tibial tendon dysfunction    2. Right foot pain    3. Pes planus of both feet      Plan:   Posterior tibial tendon dysfunction  -     Discontinue: methylPREDNISolone (MEDROL DOSEPACK) 4 mg tablet; use as directed (Patient not taking: Reported on 6/26/2024)  Dispense: 21 each; Refill: 0    Right foot pain  -     X-Ray Foot Complete Right  -     Discontinue: methylPREDNISolone (MEDROL DOSEPACK) 4 mg tablet; use as directed (Patient not  taking: Reported on 6/26/2024)  Dispense: 21 each; Refill: 0    Pes planus of both feet      Follow up if symptoms worsen or fail to improve.    Procedures        I discussed with the patient findings of mild pes planus deformity and associated posterior tibial tendinitis.  I explained this tendon is excessively stress and patients with flatter feet.  We discussed different conservative treatment options for this.  We did review proper shoe gear.  Also discussed proper arch supports.  Recommend ice over heat.  I am also going to send in a Medrol Dosepak for her.    Counseling:     I provided patient education verbally regarding:   Patient diagnosis, treatment options, as well as alternatives, risks, and benefits.     This note was created using Dragon voice recognition software that occasionally misinterpreted phrases or words.

## 2024-06-22 NOTE — PROGRESS NOTES
SUBJECTIVE:    Patient ID: Meri Krishna is a 39 y.o. female.    Chief Complaint: Follow-up (6 mo f/u//brought med list//no complaints//tc)    Pt here to checkup on acute and chronic conditions (HTN, GERD, and Migraine)    BP is doing ok today. Denies CP/SOB.      No recent kidney stones. ([place])     Has been sleeping kind of hit or miss. Has trouble turning her brain off. Takes melatonin.     Headaches have been worse the last 2 weeks. Thinks it may be due to her derm increasing her aldactone to 150mg daily and she hasn't been drinking enough.  Takes imitrex and it works, but she is waking up with them.       Denies heartburn.  Otc prilosec doesn't work as well as 40mg rx.  Did not get EGD.     Allergies are doing ok right now. Has some draining in the morning and then is usually good for the rest of the day. Takes zyrtec seasonally.     Pt feels like she is very easy to injure. Bruises fairly easy as well.        Had MRI of both hips that did not show much and did not show much. But they want her to see a back doctor. Has a small mass in the left back that is hard and mobile, that has been there a few months. Noticed it during a massage.     Her feet have been bothering her, and just finished a dose pack. Dx with PTDD([place]) and told to wear inserts and now has to get new shoes as well.   Dx snapping hip/SI joint dysfunction ([place]).    No recent labs.  Stopped biote' (Clavin) in October because kept gaining weight, was having hair loss and was not sure if it was helping. Originally was taking it for fatigue, which it did help. But doesn't feel any worse not being on it now.   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   Has been doing biote' (Clavin) since 6/2021 due to low testosterone.         Office Visit on 10/06/2023   Component Date Value Ref Range Status    CRP 10/06/2023 4.8  <8.0 mg/L Final    HALIE 10/06/2023 NEGATIVE  NEGATIVE Final     Comment: HALIE IFA is a first line screen for detecting the  presence of up to approximately 150 autoantibodies in  various autoimmune diseases. A negative HALIE IFA result  suggests an HALIE-associated autoimmune disease is not  present at this time, but is not definitive. If there  is high clinical suspicion for Sjogren's syndrome,  testing for anti-SS-A/Ro antibody should be considered.  Anti-Raeann-1 antibody should be considered for clinically  suspected inflammatory myopathies.     AC-0: Negative     International Consensus on HALIE Patterns  (https://doi.org/10.1515/oxwj-2875-0781)     For additional information, please refer to  http://education.MyNewPlace/faq/UTT089  (This link is being provided for informational/  educational purposes only.)          Rheumatoid Factor 10/06/2023 <14  <14 IU/mL Final    Sed Rate 10/06/2023 11  < OR = 20 mm/h Final         Past Medical History:   Diagnosis Date    Hypertension     Kidney stone     Migraine      Social History     Socioeconomic History    Marital status:    Tobacco Use    Smoking status: Never    Smokeless tobacco: Never   Substance and Sexual Activity    Alcohol use: Yes     Comment: rarely    Drug use: Never     Social Determinants of Health     Financial Resource Strain: Medium Risk (5/23/2024)    Overall Financial Resource Strain (CARDIA)     Difficulty of Paying Living Expenses: Somewhat hard   Food Insecurity: No Food Insecurity (5/23/2024)    Hunger Vital Sign     Worried About Running Out of Food in the Last Year: Never true     Ran Out of Food in the Last Year: Never true   Transportation Needs: No Transportation Needs (10/5/2023)    PRAPARE - Transportation     Lack of Transportation (Medical): No     Lack of Transportation (Non-Medical): No   Physical Activity: Sufficiently Active (5/23/2024)    Exercise Vital Sign     Days of Exercise per Week: 3 days     Minutes of Exercise per Session: 60 min   Stress: Stress Concern Present (5/23/2024)     [facility] - Occupational Stress Questionnaire     Feeling of Stress : To some extent   Housing Stability: Low Risk  (10/5/2023)    Housing Stability Vital Sign     Unable to Pay for Housing in the Last Year: No     Number of Places Lived in the Last Year: 1     Unstable Housing in the Last Year: No     Past Surgical History:   Procedure Laterality Date    AUGMENTATION OF BREAST      CYST REMOVAL Right 2005    KIDNEY STONE SURGERY       Family History   Problem Relation Name Age of Onset    Hypertension Mother      Obesity Mother      Diabetes Father      Hypertension Father      Hyperlipidemia Father      Stroke Father      Lung cancer Maternal Grandfather         Review of patient's allergies indicates:   Allergen Reactions    Penicillins Hives, Shortness Of Breath and Swelling    Yeast        Current Outpatient Medications:     biotin 1,000 mcg Chew, Take by mouth., Disp: , Rfl:     cyanocobalamin (VITAMIN B-12) 1000 MCG tablet, Take 100 mcg by mouth once daily., Disp: , Rfl:     drospirenone-e.estradioL-lm.FA (BEYAZ/LYDIA) 3-0.02-0.451 mg (24) (4) Tab, , Disp: , Rfl:     fluocinonide (LIDEX) 0.05 % external solution, Apply topically 2 (two) times daily., Disp: 60 mL, Rfl: 2    ketoconazole (NIZORAL) 2 % cream, Apply topically 2 (two) times daily., Disp: 60 g, Rfl: 2    NP THYROID 60 mg Tab, Take 60 mg by mouth., Disp: , Rfl:     ondansetron (ZOFRAN-ODT) 4 MG TbDL, Take 1 tablet (4 mg total) by mouth every 8 (eight) hours as needed., Disp: 10 tablet, Rfl: 0    spironolactone (ALDACTONE) 50 MG tablet, Take 150mg daily, Disp: 90 tablet, Rfl: 11    sulfacetamide sodium-sulfur 10-5 % (w/w) Clsr, Wash face qday - bid, Disp: 360 g, Rfl: 3    triamcinolone acetonide 0.025% (KENALOG) 0.025 % cream, Apply topically 2 (two) times daily., Disp: 80 g, Rfl: 1    vitamin D (VITAMIN D3) 1000 units Tab, Take 1,000 Units by mouth once daily., Disp: , Rfl:     omeprazole (PRILOSEC) 40 MG capsule,  "Take 1 capsule (40 mg total) by mouth once daily., Disp: 30 capsule, Rfl: 5    sumatriptan (IMITREX) 50 MG tablet, Take 1 tablet (50 mg total) by mouth daily as needed for Migraine. May repeat in 2 hours if needed. Max 100mg/24 hrs, Disp: 9 tablet, Rfl: 5    Review of Systems   Constitutional:  Negative for activity change, appetite change, fatigue, fever and unexpected weight change.   HENT:  Negative for hearing loss, rhinorrhea and trouble swallowing.    Eyes:  Negative for discharge and visual disturbance.   Respiratory:  Negative for cough, chest tightness, shortness of breath and wheezing.    Cardiovascular:  Negative for chest pain, palpitations and leg swelling.   Gastrointestinal:  Negative for abdominal pain, blood in stool, constipation, diarrhea, nausea and vomiting.        +heartburn   Endocrine: Negative for polydipsia and polyuria.   Genitourinary:  Negative for difficulty urinating, dysuria, frequency, hematuria, menstrual problem and urgency.   Musculoskeletal:  Positive for joint swelling. Negative for arthralgias, back pain, myalgias and neck pain.   Skin:  Negative for rash.   Neurological:  Positive for headaches. Negative for dizziness, weakness and numbness.   Hematological:  Does not bruise/bleed easily.   Psychiatric/Behavioral:  Negative for confusion, dysphoric mood, sleep disturbance and suicidal ideas. The patient is not nervous/anxious.    All other systems reviewed and are negative.         Objective:      Vitals:    06/26/24 1120   BP: 118/76   Pulse: 88   Weight: 74.8 kg (165 lb)   Height: 5' 3" (1.6 m)       Wt Readings from Last 3 Encounters:   06/26/24 74.8 kg (165 lb)   06/12/24 74.6 kg (164 lb 7.4 oz)   10/06/23 74.8 kg (164 lb 12.8 oz)         Physical Exam  Vitals reviewed.   Constitutional:       General: She is not in acute distress.     Appearance: She is well-developed.   HENT:      Head: Normocephalic and atraumatic.   Neck:      Thyroid: No thyromegaly.   Cardiovascular: "      Rate and Rhythm: Normal rate and regular rhythm.      Heart sounds: Normal heart sounds. No murmur heard.     No friction rub.   Pulmonary:      Effort: Pulmonary effort is normal.      Breath sounds: Normal breath sounds. No wheezing or rales.   Abdominal:      General: Bowel sounds are normal. There is no distension.      Palpations: Abdomen is soft.      Tenderness: There is no abdominal tenderness.   Musculoskeletal:      Cervical back: Neck supple.      Comments: Hard, mobile mass of lower left back   Lymphadenopathy:      Cervical: No cervical adenopathy.   Skin:     General: Skin is warm and dry.      Findings: No rash.   Neurological:      Mental Status: She is alert and oriented to person, place, and time.   Psychiatric:         Attention and Perception: She is attentive.         Speech: Speech normal.         Behavior: Behavior normal.         Thought Content: Thought content normal.         Judgment: Judgment normal.           Assessment:       1. Gastroesophageal reflux disease without esophagitis    2. Chronic migraine without aura without status migrainosus, not intractable    3. Mass of subcutaneous tissue of back    4. Long-term use of high-risk medication    5. Hair loss    6. Chronic fatigue             Plan:       Gastroesophageal reflux disease without esophagitis  Comments:  Controlled. Will continue to monitor symptoms on prilosec.   Orders:  -     omeprazole (PRILOSEC) 40 MG capsule; Take 1 capsule (40 mg total) by mouth once daily.  Dispense: 30 capsule; Refill: 5    Chronic migraine without aura without status migrainosus, not intractable  Comments:  Controlled. Will continue to monitor severity and frequency of headaches.  Orders:  -     sumatriptan (IMITREX) 50 MG tablet; Take 1 tablet (50 mg total) by mouth daily as needed for Migraine. May repeat in 2 hours if needed. Max 100mg/24 hrs  Dispense: 9 tablet; Refill: 5    Mass of subcutaneous tissue of back  Comments:  Will get US of  mass to eval.  Orders:  -     US Soft Tissue Lower Back; Future; Expected date: 06/26/2024    Long-term use of high-risk medication  -     sumatriptan (IMITREX) 50 MG tablet; Take 1 tablet (50 mg total) by mouth daily as needed for Migraine. May repeat in 2 hours if needed. Max 100mg/24 hrs  Dispense: 9 tablet; Refill: 5  -     Calcitriol; Future; Expected date: 06/26/2024    Hair loss  -     Calcitriol; Future; Expected date: 06/26/2024    Chronic fatigue  -     Calcitriol; Future; Expected date: 06/26/2024      Labs and/or tests have been ordered for the evaluation/monitoring of acute/chronic conditions, to be done just before next visit.    Follow up in about 6 months (around 12/26/2024) for GERD, Migraines, back mass, LABS.        6/26/2024 Ashu Hoyt

## 2024-06-26 ENCOUNTER — OFFICE VISIT (OUTPATIENT)
Dept: FAMILY MEDICINE | Facility: CLINIC | Age: 39
End: 2024-06-26
Payer: COMMERCIAL

## 2024-06-26 VITALS
BODY MASS INDEX: 29.23 KG/M2 | HEART RATE: 88 BPM | SYSTOLIC BLOOD PRESSURE: 118 MMHG | WEIGHT: 165 LBS | DIASTOLIC BLOOD PRESSURE: 76 MMHG | HEIGHT: 63 IN

## 2024-06-26 DIAGNOSIS — R22.2 MASS OF SUBCUTANEOUS TISSUE OF BACK: ICD-10-CM

## 2024-06-26 DIAGNOSIS — L65.9 HAIR LOSS: ICD-10-CM

## 2024-06-26 DIAGNOSIS — Z79.899 LONG-TERM USE OF HIGH-RISK MEDICATION: ICD-10-CM

## 2024-06-26 DIAGNOSIS — K21.9 GASTROESOPHAGEAL REFLUX DISEASE WITHOUT ESOPHAGITIS: Primary | ICD-10-CM

## 2024-06-26 DIAGNOSIS — R53.82 CHRONIC FATIGUE: ICD-10-CM

## 2024-06-26 DIAGNOSIS — G43.709 CHRONIC MIGRAINE WITHOUT AURA WITHOUT STATUS MIGRAINOSUS, NOT INTRACTABLE: ICD-10-CM

## 2024-06-26 PROCEDURE — 1159F MED LIST DOCD IN RCRD: CPT | Mod: CPTII,S$GLB,, | Performed by: FAMILY MEDICINE

## 2024-06-26 PROCEDURE — 3074F SYST BP LT 130 MM HG: CPT | Mod: CPTII,S$GLB,, | Performed by: FAMILY MEDICINE

## 2024-06-26 PROCEDURE — 1160F RVW MEDS BY RX/DR IN RCRD: CPT | Mod: CPTII,S$GLB,, | Performed by: FAMILY MEDICINE

## 2024-06-26 PROCEDURE — 3078F DIAST BP <80 MM HG: CPT | Mod: CPTII,S$GLB,, | Performed by: FAMILY MEDICINE

## 2024-06-26 PROCEDURE — 3008F BODY MASS INDEX DOCD: CPT | Mod: CPTII,S$GLB,, | Performed by: FAMILY MEDICINE

## 2024-06-26 PROCEDURE — 99214 OFFICE O/P EST MOD 30 MIN: CPT | Mod: S$GLB,,, | Performed by: FAMILY MEDICINE

## 2024-06-26 RX ORDER — SUMATRIPTAN 50 MG/1
50 TABLET, FILM COATED ORAL DAILY PRN
Qty: 9 TABLET | Refills: 5 | Status: SHIPPED | OUTPATIENT
Start: 2024-06-26

## 2024-06-26 RX ORDER — LANOLIN ALCOHOL/MO/W.PET/CERES
100 CREAM (GRAM) TOPICAL DAILY
COMMUNITY

## 2024-06-26 RX ORDER — OMEPRAZOLE 40 MG/1
40 CAPSULE, DELAYED RELEASE ORAL DAILY
Qty: 30 CAPSULE | Refills: 5 | Status: SHIPPED | OUTPATIENT
Start: 2024-06-26

## 2024-07-08 ENCOUNTER — HOSPITAL ENCOUNTER (OUTPATIENT)
Dept: RADIOLOGY | Facility: HOSPITAL | Age: 39
Discharge: HOME | End: 2024-07-08
Attending: FAMILY MEDICINE
Payer: COMMERCIAL

## 2024-07-08 DIAGNOSIS — R22.2 MASS OF SUBCUTANEOUS TISSUE OF BACK: ICD-10-CM

## 2024-07-08 PROCEDURE — 76705 ECHO EXAM OF ABDOMEN: CPT | Mod: 26,,, | Performed by: RADIOLOGY

## 2024-07-08 PROCEDURE — 76705 ECHO EXAM OF ABDOMEN: CPT | Mod: TC,PO

## 2024-07-09 ENCOUNTER — PATIENT MESSAGE (OUTPATIENT)
Dept: DERMATOLOGY | Facility: CLINIC | Age: 39
End: 2024-07-09
Payer: COMMERCIAL

## 2024-07-09 DIAGNOSIS — Z51.81 MEDICATION MONITORING ENCOUNTER: Primary | ICD-10-CM

## 2024-07-09 DIAGNOSIS — L65.9 ALOPECIA: ICD-10-CM

## 2024-07-22 ENCOUNTER — PATIENT MESSAGE (OUTPATIENT)
Dept: DERMATOLOGY | Facility: CLINIC | Age: 39
End: 2024-07-22
Payer: COMMERCIAL

## 2024-07-22 DIAGNOSIS — L65.9 ALOPECIA: ICD-10-CM

## 2024-07-22 RX ORDER — SPIRONOLACTONE 50 MG/1
TABLET, FILM COATED ORAL
Qty: 270 TABLET | Refills: 3 | Status: SHIPPED | OUTPATIENT
Start: 2024-07-22

## 2024-12-16 NOTE — PROGRESS NOTES
SUBJECTIVE:    Patient ID: Meri Krishna is a 39 y.o. female.    Chief Complaint: Follow-up (6 mo f/u//brought med list//declined flu vac//tc)    Pt here to checkup on acute and chronic conditions (HTN, GERD, and Migraine)    BP is doing ok today. Denies CP/SOB.          Had a rough spell of headaches for a few months. Was on thyroid med for a while from her GYN (with biote') and when she stopped it, they got better.   Still on the aldactone for the hair loss from the biote' (Kenneth), and her hair is starting to grow back. Takes imitrex and it works, but she is waking up with them.       Denies heartburn.  Has to take the Otc prilosec daily or she will suffer regardless of what she eats or drinks.     Allergies are doing ok.  Though seems to be having more drainage and sneezing this week.  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 Taking zyrtec.        Hips are doing ok. Has to stretch. The worst is that she sits a lot for work.      Her feet have been bothering her, and bother her the most now.  Dx with PTDD(Mancil) and inserts help. Can't be barefoot. Can't walk a lot or stand for long periods of time. Thinks she may need to go get fitted for custom inserts.   Dx snapping hip/SI joint dysfunction (Deepti).    No recent labs.    -------------------------------------  Pap done in 8/2021, yearly, hx abnormal pap 8 years ago with +HPV.   Has been doing biote' (Clavin) since 6/2021 due to low testosterone.   Mammogram 5/2024  ([facility])         Orders Only on 07/09/2024   Component Date Value Ref Range Status    Glucose 07/23/2024 91  65 - 99 mg/dL Final    Comment:               Fasting reference interval         BUN 07/23/2024 10  7 - 25 mg/dL Final    Creatinine 07/23/2024 0.95  0.50 - 0.99 mg/dL Final    eGFR 07/23/2024 77  > OR = 60 mL/min/1.73m2 Final    BUN/Creatinine Ratio 07/23/2024 SEE NOTE:  6 - 22 (calc) Final    Comment:    Not Reported: BUN and Creatinine are within      reference range.            Sodium 07/23/2024 139  135 - 146 mmol/L Final    Potassium 07/23/2024 4.1  3.5 - 5.3 mmol/L Final    Chloride 07/23/2024 106  98 - 110 mmol/L Final    CO2 07/23/2024 27  20 - 32 mmol/L Final    Calcium 07/23/2024 9.5  8.6 - 10.2 mg/dL Final    Total Protein 07/23/2024 6.6  6.1 - 8.1 g/dL Final    Albumin 07/23/2024 4.4  3.6 - 5.1 g/dL Final    Globulin, Total 07/23/2024 2.2  1.9 - 3.7 g/dL (calc) Final    Albumin/Globulin Ratio 07/23/2024 2.0  1.0 - 2.5 (calc) Final    Total Bilirubin 07/23/2024 0.4  0.2 - 1.2 mg/dL Final    Alkaline Phosphatase 07/23/2024 59  31 - 125 U/L Final    AST 07/23/2024 18  10 - 30 U/L Final    ALT 07/23/2024 15  6 - 29 U/L Final    WBC 07/23/2024 8.3  3.8 - 10.8 Thousand/uL Final    RBC 07/23/2024 4.35  3.80 - 5.10 Million/uL Final    Hemoglobin 07/23/2024 13.1  11.7 - 15.5 g/dL Final    Hematocrit 07/23/2024 40.9  35.0 - 45.0 % Final    MCV 07/23/2024 94.0  80.0 - 100.0 fL Final    MCH 07/23/2024 30.1  27.0 - 33.0 pg Final    MCHC 07/23/2024 32.0  32.0 - 36.0 g/dL Final    RDW 07/23/2024 13.1  11.0 - 15.0 % Final    Platelets 07/23/2024 399  140 - 400 Thousand/uL Final    MPV 07/23/2024 9.9  7.5 - 12.5 fL Final    Neutrophils, Abs 07/23/2024 4,648  1,500 - 7,800 cells/uL Final    Bands 07/23/2024 CANCELED  0 - 750 cells/uL Final    Result canceled by the ancillary.    Metamyelocytes 07/23/2024 CANCELED  0 cells/uL Final    Result canceled by the ancillary.    Myelocytes 07/23/2024 CANCELED  0 cells/uL Final    Result canceled by the ancillary.    Promyelocytes 07/23/2024 CANCELED  0 cells/uL Final    Result canceled by the ancillary.    Lymph # 07/23/2024 2,781  850 - 3,900 cells/uL Final    Mono # 07/23/2024 755  200 - 950 cells/uL Final    Eos # 07/23/2024 58  15 - 500 cells/uL Final    Baso # 07/23/2024 58  0 - 200 cells/uL Final    Blasts Absolute 07/23/2024 CANCELED  0 cells/uL Final    Result canceled by the ancillary.    Absolute Nucleated RBC  07/23/2024 CANCELED  0 cells/uL Final    Result canceled by the ancillary.    Neutrophils Relative 07/23/2024 56  % Final    Bands 07/23/2024 CANCELED  % Final    Result canceled by the ancillary.    Metamyelocytes Relative 07/23/2024 CANCELED  % Final    Result canceled by the ancillary.    Myelocytes 07/23/2024 CANCELED  % Final    Result canceled by the ancillary.    Promyelocytes Relative 07/23/2024 CANCELED  % Final    Result canceled by the ancillary.    Lymph % 07/23/2024 33.5  % Final    Atypical Lymphocytes Relative 07/23/2024 CANCELED  0 - 10 % Final    Result canceled by the ancillary.    Mono % 07/23/2024 9.1  % Final    Eosinophil % 07/23/2024 0.7  % Final    Basophil % 07/23/2024 0.7  % Final    Blasts 07/23/2024 CANCELED  % Final    Result canceled by the ancillary.    Manual nRBC per 100 Cells 07/23/2024 CANCELED  0 /100 WBC Final    Result canceled by the ancillary.    Differential Comment 07/23/2024 CANCELED   Final    Result canceled by the ancillary.    Iron 07/23/2024 90  40 - 190 mcg/dL Final    TIBC 07/23/2024 346  250 - 450 mcg/dL (calc) Final    Iron Saturation 07/23/2024 26  16 - 45 % (calc) Final    Ferritin 07/23/2024 51  16 - 232 ng/mL Final    Zinc 07/23/2024 69  60 - 130 mcg/dL Final    Comment: (Note)  This test was developed and its analytical performance  characteristics have been determined by [facility].   It has not been cleared or approved by the FDA. This   assay has been validated pursuant to the CLIA regulations   and is used for clinical purposes.     MD[facility]  med [facility]  [address] [address],[address]  [facility] [address] [phone]  Mikey Stone MD, PhD     Office Visit on 06/26/2024   Component Date Value Ref Range Status    Vitamin D, 1,25 (OH)2 07/09/2024 43  18 - 72 pg/mL Final    Vitamin D3, 1,25 (OH)2 07/09/2024 43  pg/mL Final    Vitamin D2, 1,25 (OH)2 07/09/2024 <8  pg/mL Final    Comment: (Note) Vitamin D3, 1,25(OH)2 indicates both  endogenous  production and supplementation. Vitamin D2, 1,25(OH)2 is   an indicator of exogenous sources, such as diet or   supplementation. Interpretation and therapy are based on   measurement of Vitamin D, 1,25 (OH)2, Total.     This test was developed, and its analytical performance   characteristics have been determined by [facility]. It has not been cleared or approved by the   FDA. This assay has been validated pursuant to the CLIA   regulations and is used for clinical purposes.     For additional information, please refer to  http://education.[employer]/faq/ECW626  (This link is being provided for   informational/educational purposes only.)     MDF  med fusion  [address] [facility] [employer] [address]  [facility] [address] [phone]  Mikey Stone MD, PhD           Past Medical History:   Diagnosis Date    Hypertension     Kidney stone     Migraine      Social History     Socioeconomic History    Marital status:    Tobacco Use    Smoking status: Never    Smokeless tobacco: Never   Substance and Sexual Activity    Alcohol use: Yes     Comment: rarely    Drug use: Never     Social Drivers of Health     Financial Resource Strain: Medium Risk (5/23/2024)    Overall Financial Resource Strain (CARDIA)     Difficulty of Paying Living Expenses: Somewhat hard   Food Insecurity: No Food Insecurity (5/23/2024)    Hunger Vital Sign     Worried About Running Out of Food in the Last Year: Never true     Ran Out of Food in the Last Year: Never true   Transportation Needs: No Transportation Needs (10/5/2023)    PRAPARE - Transportation     Lack of Transportation (Medical): No     Lack of Transportation (Non-Medical): No   Physical Activity: Sufficiently Active (5/23/2024)    Exercise Vital Sign     Days of Exercise per Week: 3 days     Minutes of Exercise per Session: 60 min   Stress: Stress Concern Present (5/23/2024)    [employer] - Occupational Stress  Questionnaire     Feeling of Stress : To some extent   Housing Stability: Low Risk  (10/5/2023)    Housing Stability Vital Sign     Unable to Pay for Housing in the Last Year: No     Number of Places Lived in the Last Year: 1     Unstable Housing in the Last Year: No     Past Surgical History:   Procedure Laterality Date    AUGMENTATION OF BREAST      CYST REMOVAL Right 2005    KIDNEY STONE SURGERY       Family History   Problem Relation Name Age of Onset    Hypertension Mother      Obesity Mother      Diabetes Father      Hypertension Father      Hyperlipidemia Father      Stroke Father      Lung cancer Maternal Grandfather         Review of patient's allergies indicates:   Allergen Reactions    Penicillins Hives, Shortness Of Breath and Swelling    Yeast        Current Outpatient Medications:     Bifidobacterium infantis (ALIGN) 10.5 mg (10 million cell) Chew, Take by mouth., Disp: , Rfl:     cyanocobalamin (VITAMIN B-12) 1000 MCG tablet, Take 100 mcg by mouth once daily., Disp: , Rfl:     drospirenone-e.estradioL-lm.FA (BEYAZ/LYDIA) 3-0.02-0.451 mg (24) (4) Tab, , Disp: , Rfl:     fluocinonide (LIDEX) 0.05 % external solution, Apply topically 2 (two) times daily., Disp: 60 mL, Rfl: 2    ketoconazole (NIZORAL) 2 % cream, Apply topically 2 (two) times daily., Disp: 60 g, Rfl: 2    melatonin 10 mg Chew, Take by mouth., Disp: , Rfl:     minoxidiL (ROGAINE) 2 % external solution, Apply 5 % topically 2 (two) times daily., Disp: , Rfl:     spironolactone (ALDACTONE) 50 MG tablet, Take 150mg daily, Disp: 270 tablet, Rfl: 3    sulfacetamide sodium-sulfur 10-5 % (w/w) Clsr, Wash face qday - bid, Disp: 360 g, Rfl: 3    triamcinolone acetonide 0.025% (KENALOG) 0.025 % cream, Apply topically 2 (two) times daily., Disp: 80 g, Rfl: 1    vitamin D (VITAMIN D3) 1000 units Tab, Take 1,000 Units by mouth once daily., Disp: , Rfl:     omeprazole (PRILOSEC) 40 MG capsule, Take 1 capsule (40 mg total) by mouth once daily., Disp: 90  "capsule, Rfl: 3    sumatriptan (IMITREX) 50 MG tablet, Take 1 tablet (50 mg total) by mouth daily as needed for Migraine. May repeat in 2 hours if needed. Max 100mg/24 hrs, Disp: 9 tablet, Rfl: 5    Review of Systems   Constitutional:  Negative for activity change, appetite change, fatigue, fever and unexpected weight change.   HENT:  Negative for hearing loss, rhinorrhea and trouble swallowing.    Eyes:  Negative for discharge and visual disturbance.   Respiratory:  Negative for cough, chest tightness, shortness of breath and wheezing.    Cardiovascular:  Negative for chest pain, palpitations and leg swelling.   Gastrointestinal:  Negative for abdominal pain, blood in stool, constipation, diarrhea, nausea and vomiting.        -heartburn   Endocrine: Negative for polydipsia and polyuria.   Genitourinary:  Negative for difficulty urinating, dysuria, frequency, hematuria, menstrual problem and urgency.   Musculoskeletal:  Negative for arthralgias, back pain, joint swelling, myalgias and neck pain.   Skin:  Negative for rash.   Neurological:  Positive for headaches. Negative for dizziness, weakness and numbness.   Hematological:  Does not bruise/bleed easily.   Psychiatric/Behavioral:  Negative for confusion, dysphoric mood, sleep disturbance and suicidal ideas. The patient is not nervous/anxious.    All other systems reviewed and are negative.         Objective:      Vitals:    12/18/24 0828   BP: 110/80   Pulse: 88   SpO2: 99%   Weight: 78 kg (172 lb)   Height: 5' 3" (1.6 m)         Wt Readings from Last 3 Encounters:   12/18/24 78 kg (172 lb)   06/26/24 74.8 kg (165 lb)   06/12/24 74.6 kg (164 lb 7.4 oz)         Physical Exam  Vitals reviewed.   Constitutional:       General: She is not in acute distress.     Appearance: She is well-developed.   HENT:      Head: Normocephalic and atraumatic.   Neck:      Thyroid: No thyromegaly.   Cardiovascular:      Rate and Rhythm: Normal rate and regular rhythm.      Heart " sounds: Normal heart sounds. No murmur heard.     No friction rub.   Pulmonary:      Effort: Pulmonary effort is normal.      Breath sounds: Normal breath sounds. No wheezing or rales.   Abdominal:      General: Bowel sounds are normal. There is no distension.      Palpations: Abdomen is soft.      Tenderness: There is no abdominal tenderness.   Musculoskeletal:      Cervical back: Neck supple.   Lymphadenopathy:      Cervical: No cervical adenopathy.   Skin:     General: Skin is warm and dry.      Findings: No rash.   Neurological:      Mental Status: She is alert and oriented to person, place, and time.   Psychiatric:         Attention and Perception: She is attentive.         Speech: Speech normal.         Behavior: Behavior normal.         Thought Content: Thought content normal.         Judgment: Judgment normal.           Assessment:       1. Gastroesophageal reflux disease without esophagitis    2. Chronic migraine without aura without status migrainosus, not intractable    3. Seasonal allergic rhinitis due to pollen               Plan:       Gastroesophageal reflux disease without esophagitis  Comments:  Controlled. Will continue to monitor symptoms on prilosec.   Orders:  -     omeprazole (PRILOSEC) 40 MG capsule; Take 1 capsule (40 mg total) by mouth once daily.  Dispense: 90 capsule; Refill: 3    Chronic migraine without aura without status migrainosus, not intractable  Comments:  Controlled. Will continue to monitor severity and frequency of headaches.  Orders:  -     sumatriptan (IMITREX) 50 MG tablet; Take 1 tablet (50 mg total) by mouth daily as needed for Migraine. May repeat in 2 hours if needed. Max 100mg/24 hrs  Dispense: 9 tablet; Refill: 5    Seasonal allergic rhinitis due to pollen  Comments:  Chronic. Will continue to monitor symptoms on zyrtec.        Labs and/or tests have been ordered for the evaluation/monitoring of acute/chronic conditions, to be done just before next visit.    Follow up  in about 6 months (around 6/18/2025) for GERD, Migraines, Allergies.        12/18/2024 Ashu Hoyt

## 2024-12-18 ENCOUNTER — OFFICE VISIT (OUTPATIENT)
Dept: FAMILY MEDICINE | Facility: CLINIC | Age: 39
End: 2024-12-18
Payer: COMMERCIAL

## 2024-12-18 VITALS
BODY MASS INDEX: 30.48 KG/M2 | OXYGEN SATURATION: 99 % | HEIGHT: 63 IN | WEIGHT: 172 LBS | HEART RATE: 88 BPM | SYSTOLIC BLOOD PRESSURE: 110 MMHG | DIASTOLIC BLOOD PRESSURE: 80 MMHG

## 2024-12-18 DIAGNOSIS — G43.709 CHRONIC MIGRAINE WITHOUT AURA WITHOUT STATUS MIGRAINOSUS, NOT INTRACTABLE: ICD-10-CM

## 2024-12-18 DIAGNOSIS — K21.9 GASTROESOPHAGEAL REFLUX DISEASE WITHOUT ESOPHAGITIS: Primary | ICD-10-CM

## 2024-12-18 DIAGNOSIS — J30.1 SEASONAL ALLERGIC RHINITIS DUE TO POLLEN: ICD-10-CM

## 2024-12-18 PROCEDURE — 3079F DIAST BP 80-89 MM HG: CPT | Mod: CPTII,S$GLB,, | Performed by: FAMILY MEDICINE

## 2024-12-18 PROCEDURE — 1159F MED LIST DOCD IN RCRD: CPT | Mod: CPTII,S$GLB,, | Performed by: FAMILY MEDICINE

## 2024-12-18 PROCEDURE — 3008F BODY MASS INDEX DOCD: CPT | Mod: CPTII,S$GLB,, | Performed by: FAMILY MEDICINE

## 2024-12-18 PROCEDURE — 99214 OFFICE O/P EST MOD 30 MIN: CPT | Mod: S$GLB,,, | Performed by: FAMILY MEDICINE

## 2024-12-18 PROCEDURE — 1160F RVW MEDS BY RX/DR IN RCRD: CPT | Mod: CPTII,S$GLB,, | Performed by: FAMILY MEDICINE

## 2024-12-18 PROCEDURE — 3074F SYST BP LT 130 MM HG: CPT | Mod: CPTII,S$GLB,, | Performed by: FAMILY MEDICINE

## 2024-12-18 RX ORDER — MINOXIDIL 2 %
5 SOLUTION, NON-ORAL TOPICAL 2 TIMES DAILY
COMMUNITY

## 2024-12-18 RX ORDER — SUMATRIPTAN SUCCINATE 50 MG/1
50 TABLET ORAL DAILY PRN
Qty: 9 TABLET | Refills: 5 | Status: SHIPPED | OUTPATIENT
Start: 2024-12-18

## 2024-12-18 RX ORDER — OMEPRAZOLE 40 MG/1
40 CAPSULE, DELAYED RELEASE ORAL DAILY
Qty: 90 CAPSULE | Refills: 3 | Status: SHIPPED | OUTPATIENT
Start: 2024-12-18

## 2024-12-18 RX ORDER — SODIUM FLUORIDE 0.1 MG/ML
RINSE ORAL
COMMUNITY

## 2025-02-10 ENCOUNTER — E-VISIT (OUTPATIENT)
Dept: FAMILY MEDICINE | Facility: CLINIC | Age: 40
End: 2025-02-10
Payer: COMMERCIAL

## 2025-02-10 DIAGNOSIS — R30.0 DYSURIA: Primary | ICD-10-CM

## 2025-02-10 RX ORDER — NITROFURANTOIN 25; 75 MG/1; MG/1
100 CAPSULE ORAL 2 TIMES DAILY
Qty: 10 CAPSULE | Refills: 0 | Status: SHIPPED | OUTPATIENT
Start: 2025-02-10 | End: 2025-02-15

## 2025-02-10 NOTE — PROGRESS NOTES
Patient ID: Meri Krishna is a 39 y.o. female.    Chief Complaint: General Illness (Entered automatically based on patient selection in path intelligence.)    The patient initiated a request through path intelligence on 2/10/2025 for evaluation and management with a chief complaint of General Illness (Entered automatically based on patient selection in path intelligence.)     I evaluated the questionnaire submission on 2/10/25.    [facility]    2/10/2025 11:48 AM CST - Filed by Patient   What do you need help with? Urinary Symptoms   Do you agree to participate in an E-Visit? Yes   If you have any of the following symptoms, please present to your local emergency room or call 911:  I acknowledge   Do you have any of the following pregnancy-related conditions? None   What is the main issue you would like addressed today? Possible uti   What symptoms do you currently have? Pain while passing urine;  Change in urine appearance or smell   When did your symptoms first appear? 2/10/2025   List what you have done or taken to help your symptoms. Increased water intake   Please indicate whether you have had the following symptoms during the past 24 hours     Urgent urination (a sudden and uncontrollable urge to urinate) Mild   Frequent urination of small amounts of urine (going to the toilet very often) Moderate   Burning pain when urinating Mild   Incomplete bladder emptying (still feel like you need to urinate again after urination) Moderate   Pain not associated with urination in the lower abdomen below the belly button) None   What does your urine look like? Clear   Blood seen in the urine None   Flank pain (pain in one or both sides of the lower back) Mild   Abnormal Vaginal Discharge (abnormal amount, color and/or odor) None   Discharge from the urethra (urinary opening) without urination None   High body temperature/fever? None-<99.5   Please rate how much discomfort you have experience because of the symptoms in the  past 24 hours: Mild   Please indicate how the symptoms have interfered with your every day activities/work in the past 24 hours: Mild   Please indicate how these symptoms have interfered with your social activities (visiting people, meeting with friends, etc.) in the past 24 hours? Mild   Are you a diabetic? No   Please indicate whether you have the following at the time of completion of this questionnaire: None of the above   Provide any additional information you feel is important.    Please attach any relevant images or files (if you have performed a home test for UTI, please submit a photo of results)    Are you able to take your vital signs? Yes   Systolic Blood Pressure: 142   Diastolic Blood Pressure: 80   Weight: 165   Height: 63   Pulse: 82   Temperature: 99   Respiration rate: 16   Pulse Oxygen:          Encounter Diagnosis   Name Primary?    Dysuria Yes        No orders of the defined types were placed in this encounter.     Medications Ordered This Encounter   Medications    nitrofurantoin, macrocrystal-monohydrate, (MACROBID) 100 MG capsule     Sig: Take 1 capsule (100 mg total) by mouth 2 (two) times daily. for 5 days     Dispense:  10 capsule     Refill:  0        No follow-ups on file.      E-Visit Time Trackin mins

## 2025-03-20 ENCOUNTER — OFFICE VISIT (OUTPATIENT)
Dept: DERMATOLOGY | Facility: CLINIC | Age: 40
End: 2025-03-20
Payer: COMMERCIAL

## 2025-03-20 DIAGNOSIS — L21.9 SEBORRHEIC DERMATITIS: ICD-10-CM

## 2025-03-20 DIAGNOSIS — L64.9 ANDROGENIC ALOPECIA: ICD-10-CM

## 2025-03-20 DIAGNOSIS — Z51.81 MEDICATION MONITORING ENCOUNTER: ICD-10-CM

## 2025-03-20 DIAGNOSIS — T14.8XXA EXCORIATION: ICD-10-CM

## 2025-03-20 DIAGNOSIS — L85.3 XEROSIS CUTIS: ICD-10-CM

## 2025-03-20 DIAGNOSIS — L65.9 ALOPECIA: Primary | ICD-10-CM

## 2025-03-20 PROCEDURE — 99214 OFFICE O/P EST MOD 30 MIN: CPT | Mod: S$GLB,,, | Performed by: STUDENT IN AN ORGANIZED HEALTH CARE EDUCATION/TRAINING PROGRAM

## 2025-03-20 PROCEDURE — 1160F RVW MEDS BY RX/DR IN RCRD: CPT | Mod: CPTII,S$GLB,, | Performed by: STUDENT IN AN ORGANIZED HEALTH CARE EDUCATION/TRAINING PROGRAM

## 2025-03-20 PROCEDURE — 1159F MED LIST DOCD IN RCRD: CPT | Mod: CPTII,S$GLB,, | Performed by: STUDENT IN AN ORGANIZED HEALTH CARE EDUCATION/TRAINING PROGRAM

## 2025-03-20 RX ORDER — SPIRONOLACTONE 100 MG/1
100 TABLET, FILM COATED ORAL DAILY
Qty: 90 TABLET | Refills: 3 | Status: SHIPPED | OUTPATIENT
Start: 2025-03-20 | End: 2026-03-20

## 2025-03-20 RX ORDER — MUPIROCIN 20 MG/G
OINTMENT TOPICAL
Qty: 30 G | Refills: 1 | Status: SHIPPED | OUTPATIENT
Start: 2025-03-20

## 2025-03-20 RX ORDER — HYDROCORTISONE 25 MG/G
CREAM TOPICAL 2 TIMES DAILY
Qty: 20 G | Refills: 1 | Status: SHIPPED | OUTPATIENT
Start: 2025-03-20 | End: 2025-03-30

## 2025-03-20 RX ORDER — MINOXIDIL 2.5 MG/1
TABLET ORAL
Qty: 90 TABLET | Refills: 3 | Status: SHIPPED | OUTPATIENT
Start: 2025-03-20

## 2025-03-20 NOTE — PROGRESS NOTES
Subjective:      Patient ID:  Meri Krishna is a 39 y.o. female who presents for   Chief Complaint   Patient presents with    Hair Loss     LOV 5/23/24    Patient here today for f/u on hair loss. Patient states she is noticing new growth slowly. Not shedding as much. Using rogaine - can get irritating so not using daily. Taking Spironolactone 150mg daily- increased at last visit.   Has slight increase urination in the morning after she takes it. No light headedness.     Blood pressure runs high.    Dryness around nose and and chin. Uses aveeno moisturizer and also cerave tinted mineral sunscreen.     Also complains of nonhealing wound in right nostril, inside nose, works in health care. Will crust up but never fully resolve.     Hx:  Wears her hair up.  No hx of dying hair, has never had clip in or weaves.   No pain.   Some itching.   No Fhx of hair loss  Was taking bioT pellets for 2-3 years, stopped this in December 2023, last one was October.      Takes beyaz daily because of ovarian cysts        Was taking hormone pellets, d/c in December 2023  Suspect hormones contributing to hair loss, possible component of traction alopecia, recommend loose hair styles  Increase spironolactone to 150mg daily   Continue rogaine 5% foam BID  Discussed benefits and risks of therapy including but not limited to breakthrough bleeding, breast tenderness, and elevated potassium levels which may give symptoms of fatigue, palpitations, and nausea. Patient should limit potassium intake - avoid potassium supplements or salt substitutes, limit bananas and citrus fruits. Pregnancy must be avoided while taking spironolactone.         Current Outpatient Medications:   ·  Bifidobacterium infantis (ALIGN) 10.5 mg (10 million cell) Chew, Take by mouth., Disp: , Rfl:   ·  cyanocobalamin (VITAMIN B-12) 1000 MCG tablet, Take 100 mcg by mouth once daily., Disp: , Rfl:   ·  drospirenone-e.estradioL-lm.FA (BEYAZ/LYDIA) 3-0.02-0.451 mg (24) (4)  Tab, , Disp: , Rfl:   ·  fluocinonide (LIDEX) 0.05 % external solution, Apply topically 2 (two) times daily., Disp: 60 mL, Rfl: 2  ·  ketoconazole (NIZORAL) 2 % cream, Apply topically 2 (two) times daily., Disp: 60 g, Rfl: 2  ·  melatonin 10 mg Chew, Take by mouth., Disp: , Rfl:   ·  minoxidiL (ROGAINE) 2 % external solution, Apply 5 % topically 2 (two) times daily., Disp: , Rfl:   ·  omeprazole (PRILOSEC) 40 MG capsule, Take 1 capsule (40 mg total) by mouth once daily., Disp: 90 capsule, Rfl: 3  ·  spironolactone (ALDACTONE) 50 MG tablet, Take 150mg daily, Disp: 270 tablet, Rfl: 3  ·  sulfacetamide sodium-sulfur 10-5 % (w/w) Clsr, Wash face qday - bid, Disp: 360 g, Rfl: 3  ·  sumatriptan (IMITREX) 50 MG tablet, Take 1 tablet (50 mg total) by mouth daily as needed for Migraine. May repeat in 2 hours if needed. Max 100mg/24 hrs, Disp: 9 tablet, Rfl: 5  ·  triamcinolone acetonide 0.025% (KENALOG) 0.025 % cream, Apply topically 2 (two) times daily., Disp: 80 g, Rfl: 1  ·  vitamin D (VITAMIN D3) 1000 units Tab, Take 1,000 Units by mouth once daily., Disp: , Rfl:           Review of Systems   Constitutional:  Negative for fever, chills and fatigue.   Respiratory:  Negative for cough and shortness of breath.    Gastrointestinal:  Negative for nausea and vomiting.   Skin:  Positive for daily sunscreen use and activity-related sunscreen use. Negative for itching, rash and dry skin.   Hematologic/Lymphatic: Does not bruise/bleed easily.       Objective:   Physical Exam   Constitutional: She appears well-developed and well-nourished.   Neurological: She is alert and oriented to person, place, and time.   Psychiatric: She has a normal mood and affect.   Skin:   Areas Examined (abnormalities noted in diagram):   Scalp / Hair Palpated and Inspected  Head / Face Inspection Performed            Diagram Legend     Erythematous scaling macule/papule c/w actinic keratosis       Vascular papule c/w angioma      Pigmented verrucoid  papule/plaque c/w seborrheic keratosis      Yellow umbilicated papule c/w sebaceous hyperplasia      Irregularly shaped tan macule c/w lentigo     1-2 mm smooth white papules consistent with Milia      Movable subcutaneous cyst with punctum c/w epidermal inclusion cyst      Subcutaneous movable cyst c/w pilar cyst      Firm pink to brown papule c/w dermatofibroma      Pedunculated fleshy papule(s) c/w skin tag(s)      Evenly pigmented macule c/w junctional nevus     Mildly variegated pigmented, slightly irregular-bordered macule c/w mildly atypical nevus      Flesh colored to evenly pigmented papule c/w intradermal nevus       Pink pearly papule/plaque c/w basal cell carcinoma      Erythematous hyperkeratotic cursted plaque c/w SCC      Surgical scar with no sign of skin cancer recurrence      Open and closed comedones      Inflammatory papules and pustules      Verrucoid papule consistent consistent with wart     Erythematous eczematous patches and plaques     Dystrophic onycholytic nail with subungual debris c/w onychomycosis     Umbilicated papule    Erythematous-base heme-crusted tan verrucoid plaque consistent with inflamed seborrheic keratosis     Erythematous Silvery Scaling Plaque c/w Psoriasis     See annotation          Today      Assessment / Plan:      Androgenic alopecia  Alopecia  -     spironolactone (ALDACTONE) 100 MG tablet; Take 1 tablet (100 mg total) by mouth once daily.  Dispense: 90 tablet; Refill: 3  -     minoxidiL (LONITEN) 2.5 MG tablet; Take 1.25mg daily  Dispense: 90 tablet; Refill: 3  Off hormone pellets  Continues to have regular menses- takes beyaz  Irritation from topical minoxidil  Will decrease her spironolactone to 100mg daily and add minoxidil 1.25 mg daily  Get CMP to check potassium  Blood pressure in clinic today: 136/88mmhg  Discussed side affects of  minoxidil:  Side effects are rare (especially at the low doses used for alopecia) and usually do not require medical attention  (report me if they continue or are bothersome):  headache  unusual hair growth, on the face, arm, and back  swelling ankles or periorbital  Headache  Lightheadedness  Pericardial effusion (very rare)      Medication monitoring encounter  -     Comprehensive Metabolic Panel; Future; Expected date: 03/20/2025    Excoriation  -     mupirocin (BACTROBAN) 2 % ointment; AAA bid  Dispense: 30 g; Refill: 1  Right inner nostril, works in healthcare, possible chronic staph, start mupirocin BID x 3 weeks- if not healed in 3 weeks message me for referral to Dr. Stevens.     Seborrheic dermatitis  -     hydrocortisone 2.5 % cream; Apply topically 2 (two) times daily. Use on AA BID x 10 days for 10 days  Dispense: 20 g; Refill: 1  Around nose    Xerosis cutis  Triple repair cream or vanicream facial moisturizer          6 months    No follow-ups on file.

## 2025-06-03 ENCOUNTER — HOSPITAL ENCOUNTER (OUTPATIENT)
Dept: RADIOLOGY | Facility: CLINIC | Age: 40
Discharge: HOME | End: 2025-06-03
Attending: FAMILY MEDICINE
Payer: COMMERCIAL

## 2025-06-03 DIAGNOSIS — Z12.31 ENCOUNTER FOR SCREENING MAMMOGRAM FOR BREAST CANCER: ICD-10-CM

## 2025-06-03 PROCEDURE — 77067 SCR MAMMO BI INCL CAD: CPT | Mod: TC,PO

## 2025-06-03 PROCEDURE — 77063 BREAST TOMOSYNTHESIS BI: CPT | Mod: 26,,, | Performed by: RADIOLOGY

## 2025-06-03 PROCEDURE — 77067 SCR MAMMO BI INCL CAD: CPT | Mod: 26,,, | Performed by: RADIOLOGY

## 2025-06-12 NOTE — PROGRESS NOTES
SUBJECTIVE:    Patient ID: Meri Krishna is a 40 y.o. female.    Chief Complaint: Follow-up (No bottles// Pt is here for a check up and medication refills//KMS)    Pt here to checkup on acute and chronic conditions (HTN, GERD, and Migraine)    BP is doing ok today. Denies CP/SOB.          No longer doing biote' because it was making her hair fall out and she was gaining weight, and the dose just kept getting higher. Derm still on the aldactone for the hair loss, also on minoxidil as well right now.    Headaches have been tolerable.  Takes imitrex as needed.     Denies heartburn.  Has to take the Otc prilosec daily or she will suffer.  Also has to sit up for at least 2 hours after eating. GI wanted to do a scope.     Allergies have been bad this week with post nasal drip at night. Taking zyrtec. Has trouble with nasal sprays.        Hips are doing ok. Has to stretch. They still get kind of tight.     Her feet still hurt, needs to get custom inserts, but they are expensive.  Dx with PTDD([place]) and inserts help. Can't be barefoot. Can't walk a lot or stand for long periods of time.    Dx snapping hip/SI joint dysfunction (Deepti).    No recent labs.    -------------------------------------  Pap 2025, yearly, hx abnormal pap 8 years ago with +HPV. ([place]) was suffering recently with ovarian cyst  Mammogram 5/2025  ([place])         No visits with results within 9 Month(s) from this visit.   Latest known visit with results is:   Orders Only on 07/09/2024   Component Date Value Ref Range Status    Glucose 07/23/2024 91  65 - 99 mg/dL Final    Comment:               Fasting reference interval         BUN 07/23/2024 10  7 - 25 mg/dL Final    Creatinine 07/23/2024 0.95  0.50 - 0.99 mg/dL Final    eGFR 07/23/2024 77  > OR = 60 mL/min/1.73m2 Final    BUN/Creatinine Ratio 07/23/2024 SEE NOTE:  6 - 22 (calc) Final    Comment:    Not Reported: BUN and Creatinine are within     reference range.            Sodium  07/23/2024 139  135 - 146 mmol/L Final    Potassium 07/23/2024 4.1  3.5 - 5.3 mmol/L Final    Chloride 07/23/2024 106  98 - 110 mmol/L Final    CO2 07/23/2024 27  20 - 32 mmol/L Final    Calcium 07/23/2024 9.5  8.6 - 10.2 mg/dL Final    Total Protein 07/23/2024 6.6  6.1 - 8.1 g/dL Final    Albumin 07/23/2024 4.4  3.6 - 5.1 g/dL Final    Globulin, Total 07/23/2024 2.2  1.9 - 3.7 g/dL (calc) Final    Albumin/Globulin Ratio 07/23/2024 2.0  1.0 - 2.5 (calc) Final    Total Bilirubin 07/23/2024 0.4  0.2 - 1.2 mg/dL Final    Alkaline Phosphatase 07/23/2024 59  31 - 125 U/L Final    AST 07/23/2024 18  10 - 30 U/L Final    ALT 07/23/2024 15  6 - 29 U/L Final    WBC 07/23/2024 8.3  3.8 - 10.8 Thousand/uL Final    RBC 07/23/2024 4.35  3.80 - 5.10 Million/uL Final    Hemoglobin 07/23/2024 13.1  11.7 - 15.5 g/dL Final    Hematocrit 07/23/2024 40.9  35.0 - 45.0 % Final    MCV 07/23/2024 94.0  80.0 - 100.0 fL Final    MCH 07/23/2024 30.1  27.0 - 33.0 pg Final    MCHC 07/23/2024 32.0  32.0 - 36.0 g/dL Final    RDW 07/23/2024 13.1  11.0 - 15.0 % Final    Platelets 07/23/2024 399  140 - 400 Thousand/uL Final    MPV 07/23/2024 9.9  7.5 - 12.5 fL Final    Neutrophils, Abs 07/23/2024 4,648  1,500 - 7,800 cells/uL Final    Bands 07/23/2024 CANCELED  0 - 750 cells/uL Final    Result canceled by the ancillary.    Metamyelocytes 07/23/2024 CANCELED  0 cells/uL Final    Result canceled by the ancillary.    Myelocytes 07/23/2024 CANCELED  0 cells/uL Final    Result canceled by the ancillary.    Promyelocytes 07/23/2024 CANCELED  0 cells/uL Final    Result canceled by the ancillary.    Lymph # 07/23/2024 2,781  850 - 3,900 cells/uL Final    Mono # 07/23/2024 755  200 - 950 cells/uL Final    Eos # 07/23/2024 58  15 - 500 cells/uL Final    Baso # 07/23/2024 58  0 - 200 cells/uL Final    Blasts Absolute 07/23/2024 CANCELED  0 cells/uL Final    Result canceled by the ancillary.    Absolute Nucleated RBC 07/23/2024 CANCELED  0 cells/uL Final     Result canceled by the ancillary.    Neutrophils Relative 07/23/2024 56  % Final    Bands 07/23/2024 CANCELED  % Final    Result canceled by the ancillary.    Metamyelocytes Relative 07/23/2024 CANCELED  % Final    Result canceled by the ancillary.    Myelocytes 07/23/2024 CANCELED  % Final    Result canceled by the ancillary.    Promyelocytes Relative 07/23/2024 CANCELED  % Final    Result canceled by the ancillary.    Lymph % 07/23/2024 33.5  % Final    Atypical Lymphocytes Relative 07/23/2024 CANCELED  0 - 10 % Final    Result canceled by the ancillary.    Mono % 07/23/2024 9.1  % Final    Eosinophil % 07/23/2024 0.7  % Final    Basophil % 07/23/2024 0.7  % Final    Blasts 07/23/2024 CANCELED  % Final    Result canceled by the ancillary.    Manual nRBC per 100 Cells 07/23/2024 CANCELED  0 /100 WBC Final    Result canceled by the ancillary.    Differential Comment 07/23/2024 CANCELED   Final    Result canceled by the ancillary.    Iron 07/23/2024 90  40 - 190 mcg/dL Final    TIBC 07/23/2024 346  250 - 450 mcg/dL (calc) Final    Iron Saturation 07/23/2024 26  16 - 45 % (calc) Final    Ferritin 07/23/2024 51  16 - 232 ng/mL Final    Zinc 07/23/2024 69  60 - 130 mcg/dL Final    Comment: (Note)  This test was developed and its analytical performance  characteristics have been determined by [facility].   It has not been cleared or approved by the FDA. This   assay has been validated pursuant to the CLIA regulations   and is used for clinical purposes.     MDF  [facility]  [address]  [place] [address]  [phone]  Mikey Stone MD, PhD           Past Medical History:   Diagnosis Date    Hypertension     Kidney stone     Migraine      Social History     Socioeconomic History    Marital status:    Tobacco Use    Smoking status: Never    Smokeless tobacco: Never   Substance and Sexual Activity    Alcohol use: Yes     Comment: rarely    Drug use: Never     Social  Drivers of Health     Financial Resource Strain: Medium Risk (5/23/2024)    Overall Financial Resource Strain (CARDIA)     Difficulty of Paying Living Expenses: Somewhat hard   Food Insecurity: No Food Insecurity (5/23/2024)    Hunger Vital Sign     Worried About Running Out of Food in the Last Year: Never true     Ran Out of Food in the Last Year: Never true   Transportation Needs: No Transportation Needs (10/5/2023)    PRAPARE - Transportation     Lack of Transportation (Medical): No     Lack of Transportation (Non-Medical): No   Physical Activity: Sufficiently Active (5/23/2024)    Exercise Vital Sign     Days of Exercise per Week: 3 days     Minutes of Exercise per Session: 60 min   Stress: Stress Concern Present (5/23/2024)    [employer] [place] [employer] - Occupational Stress Questionnaire     Feeling of Stress : To some extent   Housing Stability: Low Risk  (10/5/2023)    Housing Stability Vital Sign     Unable to Pay for Housing in the Last Year: No     Number of Places Lived in the Last Year: 1     Unstable Housing in the Last Year: No     Past Surgical History:   Procedure Laterality Date    AUGMENTATION OF BREAST      CYST REMOVAL Right 2005    KIDNEY STONE SURGERY       Family History   Problem Relation Name Age of Onset    Hypertension Mother      Obesity Mother      Diabetes Father      Hypertension Father      Hyperlipidemia Father      Stroke Father      Lung cancer Maternal Grandfather         Review of patient's allergies indicates:   Allergen Reactions    Penicillins Hives, Shortness Of Breath and Swelling    Yeast        Current Outpatient Medications:     Bifidobacterium infantis (ALIGN) 10.5 mg (10 million cell) Chew, Take by mouth., Disp: , Rfl:     cyanocobalamin (VITAMIN B-12) 1000 MCG tablet, Take 100 mcg by mouth once daily., Disp: , Rfl:     drospirenone-e.estradioL-lm.FA (BEYAZ/LYDIA) 3-0.02-0.451 mg (24) (4) Tab, , Disp: , Rfl:     fluocinonide (LIDEX) 0.05 % external  solution, Apply topically 2 (two) times daily., Disp: 60 mL, Rfl: 2    hydrocortisone 2.5 % cream, Apply topically 2 (two) times daily. Use on AA BID x 10 days for 10 days, Disp: 20 g, Rfl: 1    ketoconazole (NIZORAL) 2 % cream, Apply topically 2 (two) times daily., Disp: 60 g, Rfl: 2    melatonin 10 mg Chew, Take by mouth., Disp: , Rfl:     minoxidiL (LONITEN) 2.5 MG tablet, Take 1.25mg daily, Disp: 90 tablet, Rfl: 3    mupirocin (BACTROBAN) 2 % ointment, AAA bid, Disp: 30 g, Rfl: 1    omeprazole (PRILOSEC) 40 MG capsule, Take 1 capsule (40 mg total) by mouth once daily., Disp: 90 capsule, Rfl: 3    spironolactone (ALDACTONE) 100 MG tablet, Take 1 tablet (100 mg total) by mouth once daily., Disp: 90 tablet, Rfl: 3    sulfacetamide sodium-sulfur 10-5 % (w/w) Clsr, Wash face qday - bid, Disp: 360 g, Rfl: 3    sumatriptan (IMITREX) 50 MG tablet, Take 1 tablet (50 mg total) by mouth daily as needed for Migraine. May repeat in 2 hours if needed. Max 100mg/24 hrs, Disp: 9 tablet, Rfl: 5    triamcinolone acetonide 0.025% (KENALOG) 0.025 % cream, Apply topically 2 (two) times daily., Disp: 80 g, Rfl: 1    vitamin D (VITAMIN D3) 1000 units Tab, Take 1,000 Units by mouth once daily., Disp: , Rfl:     Review of Systems   Constitutional:  Negative for activity change, appetite change, fatigue, fever and unexpected weight change.   HENT:  Negative for hearing loss, rhinorrhea and trouble swallowing.    Eyes:  Negative for discharge and visual disturbance.   Respiratory:  Negative for cough, chest tightness, shortness of breath and wheezing.    Cardiovascular:  Negative for chest pain, palpitations and leg swelling.   Gastrointestinal:  Negative for abdominal pain, blood in stool, constipation, diarrhea, nausea and vomiting.        -heartburn   Endocrine: Negative for polydipsia and polyuria.   Genitourinary:  Negative for difficulty urinating, dysuria, frequency, hematuria, menstrual problem and urgency.   Musculoskeletal:   "Negative for arthralgias, back pain, joint swelling, myalgias and neck pain.   Skin:  Negative for rash.   Neurological:  Positive for headaches. Negative for dizziness, weakness and numbness.   Hematological:  Does not bruise/bleed easily.   Psychiatric/Behavioral:  Negative for confusion, dysphoric mood, sleep disturbance and suicidal ideas. The patient is not nervous/anxious.    All other systems reviewed and are negative.         Objective:      Vitals:    06/13/25 0837   BP: 114/70   Pulse: 95   SpO2: 98%   Weight: 73.3 kg (161 lb 9.6 oz)   Height: 5' 3" (1.6 m)         Wt Readings from Last 3 Encounters:   06/13/25 73.3 kg (161 lb 9.6 oz)   12/18/24 78 kg (172 lb)   06/26/24 74.8 kg (165 lb)         Physical Exam  Vitals reviewed.   Constitutional:       General: She is not in acute distress.     Appearance: She is well-developed.   HENT:      Head: Normocephalic and atraumatic.   Neck:      Thyroid: No thyromegaly.   Cardiovascular:      Rate and Rhythm: Normal rate and regular rhythm.      Heart sounds: Normal heart sounds. No murmur heard.     No friction rub.   Pulmonary:      Effort: Pulmonary effort is normal.      Breath sounds: Normal breath sounds. No wheezing or rales.   Abdominal:      General: Bowel sounds are normal. There is no distension.      Palpations: Abdomen is soft.      Tenderness: There is no abdominal tenderness.   Musculoskeletal:      Cervical back: Neck supple.   Lymphadenopathy:      Cervical: No cervical adenopathy.   Skin:     General: Skin is warm and dry.      Findings: No rash.   Neurological:      Mental Status: She is alert and oriented to person, place, and time.   Psychiatric:         Attention and Perception: She is attentive.         Speech: Speech normal.         Behavior: Behavior normal.         Thought Content: Thought content normal.         Judgment: Judgment normal.           Assessment:       1. Gastroesophageal reflux disease without esophagitis    2. Chronic " migraine without aura without status migrainosus, not intractable    3. Seasonal allergic rhinitis due to pollen    4. Long-term use of high-risk medication                 Plan:       Gastroesophageal reflux disease without esophagitis  Comments:  Controlled. Will continue to monitor symptoms on ppi.  Orders:  -     omeprazole (PRILOSEC) 40 MG capsule; Take 1 capsule (40 mg total) by mouth once daily.  Dispense: 90 capsule; Refill: 3    Chronic migraine without aura without status migrainosus, not intractable  Comments:  Controlled. Will continue to monitor symptoms on imitrex.  Orders:  -     sumatriptan (IMITREX) 50 MG tablet; Take 1 tablet (50 mg total) by mouth daily as needed for Migraine. May repeat in 2 hours if needed. Max 100mg/24 hrs  Dispense: 9 tablet; Refill: 5    Seasonal allergic rhinitis due to pollen  Comments:  Chronic. To continue zyrtec. Also advised to use flonase flareups, instruected on proper use.    Long-term use of high-risk medication  -     TSH w/reflex to FT4; Future; Expected date: 06/13/2025  -     Urinalysis, Reflex to Urine Culture Urine, Clean Catch; Future; Expected date: 06/13/2025  -     CBC Auto Differential; Future; Expected date: 06/13/2025  -     Lipid Panel; Future; Expected date: 06/13/2025  -     Comprehensive Metabolic Panel; Future; Expected date: 06/13/2025      Labs and/or tests have been ordered for the evaluation/monitoring of acute/chronic conditions, to be done just before next visit.    Follow up in about 6 months (around 12/13/2025) for GERD, Migraines, Allergies, LABS.        6/13/2025 Ashu Hoyt

## 2025-06-13 ENCOUNTER — OFFICE VISIT (OUTPATIENT)
Dept: FAMILY MEDICINE | Facility: CLINIC | Age: 40
End: 2025-06-13
Payer: COMMERCIAL

## 2025-06-13 VITALS
OXYGEN SATURATION: 98 % | BODY MASS INDEX: 28.64 KG/M2 | DIASTOLIC BLOOD PRESSURE: 70 MMHG | HEART RATE: 95 BPM | WEIGHT: 161.63 LBS | SYSTOLIC BLOOD PRESSURE: 114 MMHG | HEIGHT: 63 IN

## 2025-06-13 DIAGNOSIS — G43.709 CHRONIC MIGRAINE WITHOUT AURA WITHOUT STATUS MIGRAINOSUS, NOT INTRACTABLE: ICD-10-CM

## 2025-06-13 DIAGNOSIS — K21.9 GASTROESOPHAGEAL REFLUX DISEASE WITHOUT ESOPHAGITIS: Primary | ICD-10-CM

## 2025-06-13 DIAGNOSIS — Z79.899 LONG-TERM USE OF HIGH-RISK MEDICATION: ICD-10-CM

## 2025-06-13 DIAGNOSIS — J30.1 SEASONAL ALLERGIC RHINITIS DUE TO POLLEN: ICD-10-CM

## 2025-06-13 RX ORDER — OMEPRAZOLE 40 MG/1
40 CAPSULE, DELAYED RELEASE ORAL DAILY
Qty: 90 CAPSULE | Refills: 3 | Status: SHIPPED | OUTPATIENT
Start: 2025-06-13

## 2025-06-13 RX ORDER — SUMATRIPTAN SUCCINATE 50 MG/1
50 TABLET ORAL DAILY PRN
Qty: 9 TABLET | Refills: 5 | Status: SHIPPED | OUTPATIENT
Start: 2025-06-13

## 2025-08-13 ENCOUNTER — OFFICE VISIT (OUTPATIENT)
Dept: PODIATRY | Facility: CLINIC | Age: 40
End: 2025-08-13
Payer: COMMERCIAL

## 2025-08-13 VITALS — HEART RATE: 97 BPM | OXYGEN SATURATION: 99 % | HEIGHT: 63 IN | BODY MASS INDEX: 28.13 KG/M2 | WEIGHT: 158.75 LBS

## 2025-08-13 DIAGNOSIS — M21.41 PES PLANUS OF BOTH FEET: ICD-10-CM

## 2025-08-13 DIAGNOSIS — M21.42 PES PLANUS OF BOTH FEET: ICD-10-CM

## 2025-08-13 DIAGNOSIS — M79.672 CHRONIC FOOT PAIN, LEFT: ICD-10-CM

## 2025-08-13 DIAGNOSIS — M76.829 POSTERIOR TIBIAL TENDON DYSFUNCTION: Primary | ICD-10-CM

## 2025-08-13 DIAGNOSIS — G89.29 CHRONIC FOOT PAIN, LEFT: ICD-10-CM

## 2025-08-13 PROCEDURE — 1160F RVW MEDS BY RX/DR IN RCRD: CPT | Mod: CPTII,S$GLB,, | Performed by: PODIATRIST

## 2025-08-13 PROCEDURE — 99214 OFFICE O/P EST MOD 30 MIN: CPT | Mod: S$GLB,,, | Performed by: PODIATRIST

## 2025-08-13 PROCEDURE — 99999 PR PBB SHADOW E&M-EST. PATIENT-LVL IV: CPT | Mod: PBBFAC,,, | Performed by: PODIATRIST

## 2025-08-13 PROCEDURE — 1159F MED LIST DOCD IN RCRD: CPT | Mod: CPTII,S$GLB,, | Performed by: PODIATRIST

## 2025-08-13 PROCEDURE — 3008F BODY MASS INDEX DOCD: CPT | Mod: CPTII,S$GLB,, | Performed by: PODIATRIST

## 2025-08-13 RX ORDER — MELOXICAM 15 MG/1
15 TABLET ORAL DAILY
Qty: 30 TABLET | Refills: 3 | Status: SHIPPED | OUTPATIENT
Start: 2025-08-13

## 2025-08-13 RX ORDER — METHYLPREDNISOLONE 4 MG/1
TABLET ORAL
Qty: 21 EACH | Refills: 1 | Status: SHIPPED | OUTPATIENT
Start: 2025-08-13 | End: 2025-09-03

## 2025-08-18 ENCOUNTER — PATIENT MESSAGE (OUTPATIENT)
Dept: PODIATRY | Facility: CLINIC | Age: 40
End: 2025-08-18
Payer: COMMERCIAL

## 2025-08-27 ENCOUNTER — PATIENT MESSAGE (OUTPATIENT)
Dept: PODIATRY | Facility: CLINIC | Age: 40
End: 2025-08-27
Payer: COMMERCIAL